# Patient Record
Sex: MALE | Race: WHITE | NOT HISPANIC OR LATINO | Employment: FULL TIME | URBAN - METROPOLITAN AREA
[De-identification: names, ages, dates, MRNs, and addresses within clinical notes are randomized per-mention and may not be internally consistent; named-entity substitution may affect disease eponyms.]

---

## 2017-11-30 ENCOUNTER — OFFICE VISIT (OUTPATIENT)
Dept: FAMILY MEDICINE | Facility: CLINIC | Age: 57
End: 2017-11-30
Payer: COMMERCIAL

## 2017-11-30 VITALS
DIASTOLIC BLOOD PRESSURE: 62 MMHG | HEART RATE: 72 BPM | BODY MASS INDEX: 26.98 KG/M2 | SYSTOLIC BLOOD PRESSURE: 200 MMHG | WEIGHT: 199.19 LBS | HEIGHT: 72 IN | TEMPERATURE: 99 F

## 2017-11-30 DIAGNOSIS — E66.3 OVERWEIGHT (BMI 25.0-29.9): ICD-10-CM

## 2017-11-30 DIAGNOSIS — Z72.0 NICOTINE ABUSE: ICD-10-CM

## 2017-11-30 DIAGNOSIS — F15.10 CAFFEINE ABUSE: ICD-10-CM

## 2017-11-30 DIAGNOSIS — J32.9 SINUSITIS, UNSPECIFIED CHRONICITY, UNSPECIFIED LOCATION: ICD-10-CM

## 2017-11-30 DIAGNOSIS — I16.0 HYPERTENSIVE URGENCY: Primary | ICD-10-CM

## 2017-11-30 DIAGNOSIS — Z12.5 PROSTATE CANCER SCREENING: ICD-10-CM

## 2017-11-30 DIAGNOSIS — Z00.00 HEALTHCARE MAINTENANCE: ICD-10-CM

## 2017-11-30 PROCEDURE — 99999 PR PBB SHADOW E&M-NEW PATIENT-LVL III: CPT | Mod: PBBFAC,,, | Performed by: INTERNAL MEDICINE

## 2017-11-30 PROCEDURE — 93005 ELECTROCARDIOGRAM TRACING: CPT | Mod: S$GLB,,, | Performed by: INTERNAL MEDICINE

## 2017-11-30 PROCEDURE — 93010 ELECTROCARDIOGRAM REPORT: CPT | Mod: S$GLB,,, | Performed by: INTERNAL MEDICINE

## 2017-11-30 PROCEDURE — 99204 OFFICE O/P NEW MOD 45 MIN: CPT | Mod: S$GLB,,, | Performed by: INTERNAL MEDICINE

## 2017-11-30 RX ORDER — METOPROLOL SUCCINATE 25 MG/1
TABLET, EXTENDED RELEASE ORAL
Qty: 30 TABLET | Refills: 2 | Status: SHIPPED | OUTPATIENT
Start: 2017-11-30 | End: 2018-02-06

## 2017-11-30 RX ORDER — AZITHROMYCIN 250 MG/1
1 TABLET, FILM COATED ORAL DAILY
Refills: 0 | COMMUNITY
Start: 2017-11-29 | End: 2017-12-04

## 2017-11-30 RX ORDER — BENZONATATE 200 MG/1
1 CAPSULE ORAL DAILY
Refills: 0 | COMMUNITY
Start: 2017-11-29 | End: 2018-02-06

## 2017-11-30 RX ORDER — CLONIDINE HYDROCHLORIDE 0.1 MG/1
0.1 TABLET ORAL
Status: COMPLETED | OUTPATIENT
Start: 2017-11-30 | End: 2017-11-30

## 2017-11-30 RX ADMIN — CLONIDINE HYDROCHLORIDE 0.1 MG: 0.1 TABLET ORAL at 03:11

## 2017-11-30 NOTE — PATIENT INSTRUCTIONS
Begin metoprolol 25 mg first dose today; then w every morning; monitor BP at home closely; to ER tonight if  or higher or DBP>95. To see me in 2 weeks w labs prior. Call if needed.

## 2017-11-30 NOTE — PROGRESS NOTES
Subjective:       Patient ID: Marshall Victor is a 57 y.o. male.    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 Has been going to [facility] urgent care for any issues, last 1 yr ago and not elevated; periodically checks his BP at home ; wife w hx of pre-eclampsia. At home last night 145/72; caffeine good bitt of note. No chest pain or SOB; no HA, or vision change. Trying to quit cigarettes but more stress today; smoked more; gave large presentation as well which was stressful. On nicorette losenger prn as well. PSA 2-3 yrs ago was nl; declines TC; not done yet. Will do FOBT though. No hx of stool for OCB. At 440 pm, /84 after clonidine 0.1 mg given at 335 pm. No limb numbness or weakness, or vision change. Prefers to not go to ER if possible. Feel can be managed at home w med and close monitoring.  Patient to begin metoprolol succinate 25 mg by mouth this afternoon/tonight and every morning thereafter and maintain a less than 2 g sodium diet.  Close blood pressure monitoring at home and keeping a log was highly advised. EKG to be obtained. To ER if SBP greater or equal to 190 or DBP>95.  Plan of care discussed w pt at length. Labs ordered.    Review of Systems   Constitutional: Negative for appetite change and unexpected weight change.   HENT: Positive for congestion, postnasal drip, rhinorrhea and sinus pressure.         Denies seasonal allergies, or perennial allergies; on Z-pack 5 day for sinusitis from recent urgent care visit.   Eyes: Negative for discharge and itching.   Respiratory: Negative for cough, chest tightness, shortness of breath and wheezing.    Cardiovascular: Negative for chest pain, palpitations and leg swelling.   Gastrointestinal: Negative for abdominal pain, blood in stool, constipation, diarrhea, nausea and vomiting.   Endocrine: Negative for  polydipsia, polyphagia and polyuria.   Genitourinary: Negative for dysuria and hematuria.   Musculoskeletal: Negative for arthralgias and myalgias.   Skin: Negative for rash.   Allergic/Immunologic: Negative for environmental allergies and food allergies.   Neurological: Negative for dizziness, tremors, seizures, speech difficulty, light-headedness, numbness and headaches.   Hematological: Negative for adenopathy. Does not bruise/bleed easily.   Psychiatric/Behavioral:        Denies anxiety or depression.       Objective:      Vitals:    11/30/17 1504   BP: (!) 200/62   Pulse: 72   Temp: 98.6 °F (37 °C)   TempSrc: Oral   Weight: 90.4 kg (199 lb 3 oz)   Height: 6' (1.829 m)     Body mass index is 27.01 kg/m².    Physical Exam   Constitutional: He is oriented to person, place, and time. He appears well-developed and well-nourished.   HENT:   Head: Normocephalic and atraumatic.   Eyes: EOM are normal.   Neck: Normal range of motion. Neck supple. No thyromegaly present.   No carotid bruits heard.   Cardiovascular: Normal rate, regular rhythm and normal heart sounds.  Exam reveals no gallop.    No murmur heard.  Pulmonary/Chest: Effort normal and breath sounds normal. No respiratory distress. He has no wheezes. He has no rales.   Abdominal: Soft. Bowel sounds are normal. He exhibits no distension. There is no tenderness. There is no rebound and no guarding.   Musculoskeletal: Normal range of motion. He exhibits no edema.   Lymphadenopathy:     He has no cervical adenopathy.   Neurological: He is alert and oriented to person, place, and time.   Moves all 4 extremities fine. CN's II-XII grossly intact; smell not checked; MS 5/5; DTR's 2+ UE's and LE's.   Skin: No rash noted.   Psychiatric: He has a normal mood and affect. His behavior is normal. Thought content normal.   Vitals reviewed.      Assessment:       1. Hypertensive urgency    2. Overweight (BMI 25.0-29.9)    3. Nicotine abuse    4. Caffeine abuse    5.  Sinusitis, unspecified chronicity, unspecified location    6. Prostate cancer screening    7. Healthcare maintenance        Plan:       Hypertensive urgency. Maintain < 2 Gm Na a day diet, and monitor BP at home; keep a log. Stress reduction; limit caffeine more.     Labs ordered-     cloNIDine tablet 0.1 mg; Take 1 tablet (0.1 mg total) by mouth one time.         Metoprolol succinate 25 mg po today, then 25 mg po every am.    Overweight (BMI 25.0-29.9). Caloric restriction w regular exercise and weight reduction.    Nicotine abuse. Knows he needs to quit; aware of risks involved. 1-800-QUIT-NOW; for counseling and support.  Continue nicotine losengers on a when necessary basis    Caffeine abuse; needs to reduce caffeine intake; consider 1/2 cut caffeine.    Sinusitis, unspecified chronicity, unspecified location; finish Z-pack; tody is day #2; no decongestants; has benzonate for cough.     Healthcare maintenance; PSA, FOBT ordered; TC declined.    Addendum: EKG w no evidence of ischemic changes.

## 2017-12-05 ENCOUNTER — LAB VISIT (OUTPATIENT)
Dept: LAB | Facility: HOSPITAL | Age: 57
End: 2017-12-05
Attending: INTERNAL MEDICINE
Payer: COMMERCIAL

## 2017-12-05 DIAGNOSIS — Z12.5 PROSTATE CANCER SCREENING: ICD-10-CM

## 2017-12-05 DIAGNOSIS — E66.3 OVERWEIGHT (BMI 25.0-29.9): ICD-10-CM

## 2017-12-05 DIAGNOSIS — Z00.00 HEALTHCARE MAINTENANCE: ICD-10-CM

## 2017-12-05 DIAGNOSIS — I16.0 HYPERTENSIVE URGENCY: ICD-10-CM

## 2017-12-05 LAB
ALBUMIN SERPL BCP-MCNC: 3.8 G/DL
ALP SERPL-CCNC: 70 U/L
ALT SERPL W/O P-5'-P-CCNC: 29 U/L
ANION GAP SERPL CALC-SCNC: 11 MMOL/L
AST SERPL-CCNC: 24 U/L
BASOPHILS # BLD AUTO: 0.08 K/UL
BASOPHILS NFR BLD: 0.7 %
BILIRUB SERPL-MCNC: 0.4 MG/DL
BUN SERPL-MCNC: 14 MG/DL
CALCIUM SERPL-MCNC: 9.5 MG/DL
CHLORIDE SERPL-SCNC: 92 MMOL/L
CHOLEST SERPL-MCNC: 180 MG/DL
CHOLEST/HDLC SERPL: 2.9 {RATIO}
CO2 SERPL-SCNC: 23 MMOL/L
CREAT SERPL-MCNC: 1.1 MG/DL
DIFFERENTIAL METHOD: ABNORMAL
EOSINOPHIL # BLD AUTO: 0.1 K/UL
EOSINOPHIL NFR BLD: 0.8 %
ERYTHROCYTE [DISTWIDTH] IN BLOOD BY AUTOMATED COUNT: 11.9 %
EST. GFR  (AFRICAN AMERICAN): >60 ML/MIN/1.73 M^2
EST. GFR  (NON AFRICAN AMERICAN): >60 ML/MIN/1.73 M^2
GLUCOSE SERPL-MCNC: 106 MG/DL
HCT VFR BLD AUTO: 42.9 %
HDLC SERPL-MCNC: 62 MG/DL
HDLC SERPL: 34.4 %
HGB BLD-MCNC: 15.1 G/DL
IMM GRANULOCYTES # BLD AUTO: 0.08 K/UL
IMM GRANULOCYTES NFR BLD AUTO: 0.7 %
LDLC SERPL CALC-MCNC: 93.8 MG/DL
LYMPHOCYTES # BLD AUTO: 2.6 K/UL
LYMPHOCYTES NFR BLD: 23.6 %
MCH RBC QN AUTO: 30.6 PG
MCHC RBC AUTO-ENTMCNC: 35.2 G/DL
MCV RBC AUTO: 87 FL
MONOCYTES # BLD AUTO: 1.4 K/UL
MONOCYTES NFR BLD: 12.8 %
NEUTROPHILS # BLD AUTO: 6.7 K/UL
NEUTROPHILS NFR BLD: 61.4 %
NONHDLC SERPL-MCNC: 118 MG/DL
NRBC BLD-RTO: 0 /100 WBC
PLATELET # BLD AUTO: 383 K/UL
PMV BLD AUTO: 9 FL
POTASSIUM SERPL-SCNC: 4.4 MMOL/L
PROT SERPL-MCNC: 7.4 G/DL
RBC # BLD AUTO: 4.93 M/UL
SODIUM SERPL-SCNC: 126 MMOL/L
T4 FREE SERPL-MCNC: 1.27 NG/DL
TRIGL SERPL-MCNC: 121 MG/DL
TSH SERPL DL<=0.005 MIU/L-ACNC: 1.07 UIU/ML
WBC # BLD AUTO: 10.91 K/UL

## 2017-12-05 PROCEDURE — 80053 COMPREHEN METABOLIC PANEL: CPT

## 2017-12-05 PROCEDURE — 84439 ASSAY OF FREE THYROXINE: CPT

## 2017-12-05 PROCEDURE — 84153 ASSAY OF PSA TOTAL: CPT

## 2017-12-05 PROCEDURE — 36415 COLL VENOUS BLD VENIPUNCTURE: CPT | Mod: PN

## 2017-12-05 PROCEDURE — 80061 LIPID PANEL: CPT

## 2017-12-05 PROCEDURE — 86803 HEPATITIS C AB TEST: CPT

## 2017-12-05 PROCEDURE — 85025 COMPLETE CBC W/AUTO DIFF WBC: CPT

## 2017-12-05 PROCEDURE — 84443 ASSAY THYROID STIM HORMONE: CPT

## 2017-12-06 ENCOUNTER — TELEPHONE (OUTPATIENT)
Dept: FAMILY MEDICINE | Facility: CLINIC | Age: 57
End: 2017-12-06

## 2017-12-06 DIAGNOSIS — E87.1 HYPONATREMIA: Primary | ICD-10-CM

## 2017-12-06 LAB
COMPLEXED PSA SERPL-MCNC: 1.1 NG/ML
HCV AB SERPL QL IA: NEGATIVE

## 2017-12-06 NOTE — TELEPHONE ENCOUNTER
These notify patient that we received his labs his sodium was low at 126.  His reflects an increase of free body water.  Asked him if he's been drinking a lot of water all day long and if so to stop and maintain less than 800 to thousand cc of water per day; other words don't make water his main fluid intake; he needs to repeat her blood chemistry now to verify results.  An order has been placed at [facility]; he does not need to be fasting for this.  Please schedule follow-up appointment to see me in the next few days to go over results

## 2017-12-07 ENCOUNTER — LAB VISIT (OUTPATIENT)
Dept: LAB | Facility: HOSPITAL | Age: 57
End: 2017-12-07
Attending: INTERNAL MEDICINE
Payer: COMMERCIAL

## 2017-12-07 DIAGNOSIS — E87.1 HYPONATREMIA: ICD-10-CM

## 2017-12-07 LAB
ANION GAP SERPL CALC-SCNC: 8 MMOL/L
BUN SERPL-MCNC: 13 MG/DL
CALCIUM SERPL-MCNC: 9.6 MG/DL
CHLORIDE SERPL-SCNC: 96 MMOL/L
CO2 SERPL-SCNC: 25 MMOL/L
CREAT SERPL-MCNC: 1 MG/DL
EST. GFR  (AFRICAN AMERICAN): >60 ML/MIN/1.73 M^2
EST. GFR  (NON AFRICAN AMERICAN): >60 ML/MIN/1.73 M^2
GLUCOSE SERPL-MCNC: 85 MG/DL
POTASSIUM SERPL-SCNC: 4.6 MMOL/L
SODIUM SERPL-SCNC: 129 MMOL/L

## 2017-12-07 PROCEDURE — 80048 BASIC METABOLIC PNL TOTAL CA: CPT

## 2017-12-07 PROCEDURE — 36415 COLL VENOUS BLD VENIPUNCTURE: CPT | Mod: PN

## 2017-12-08 ENCOUNTER — OFFICE VISIT (OUTPATIENT)
Dept: FAMILY MEDICINE | Facility: CLINIC | Age: 57
End: 2017-12-08
Payer: COMMERCIAL

## 2017-12-08 ENCOUNTER — LAB VISIT (OUTPATIENT)
Dept: LAB | Facility: HOSPITAL | Age: 57
End: 2017-12-08
Attending: INTERNAL MEDICINE
Payer: COMMERCIAL

## 2017-12-08 VITALS
TEMPERATURE: 98 F | DIASTOLIC BLOOD PRESSURE: 80 MMHG | SYSTOLIC BLOOD PRESSURE: 132 MMHG | HEART RATE: 72 BPM | BODY MASS INDEX: 26.2 KG/M2 | WEIGHT: 193.44 LBS | HEIGHT: 72 IN

## 2017-12-08 DIAGNOSIS — T50.905A MEDICATION SIDE EFFECT, INITIAL ENCOUNTER: ICD-10-CM

## 2017-12-08 DIAGNOSIS — E87.1 HYPONATREMIA: ICD-10-CM

## 2017-12-08 DIAGNOSIS — E66.3 OVERWEIGHT (BMI 25.0-29.9): ICD-10-CM

## 2017-12-08 DIAGNOSIS — Z23 NEED FOR DIPHTHERIA-TETANUS-PERTUSSIS (TDAP) VACCINE: ICD-10-CM

## 2017-12-08 DIAGNOSIS — I16.0 HYPERTENSIVE URGENCY: ICD-10-CM

## 2017-12-08 DIAGNOSIS — Z78.9 ALCOHOL USE: ICD-10-CM

## 2017-12-08 DIAGNOSIS — I10 UNCONTROLLED HYPERTENSION: Primary | ICD-10-CM

## 2017-12-08 LAB
BILIRUB UR QL STRIP: NEGATIVE
CLARITY UR REFRACT.AUTO: CLEAR
COLOR UR AUTO: YELLOW
GLUCOSE UR QL STRIP: NEGATIVE
HGB UR QL STRIP: ABNORMAL
KETONES UR QL STRIP: NEGATIVE
LEUKOCYTE ESTERASE UR QL STRIP: NEGATIVE
MICROSCOPIC COMMENT: NORMAL
NITRITE UR QL STRIP: NEGATIVE
PH UR STRIP: 6 [PH] (ref 5–8)
PROT UR QL STRIP: NEGATIVE
RBC #/AREA URNS AUTO: 4 /HPF (ref 0–4)
SP GR UR STRIP: 1.01 (ref 1–1.03)
URN SPEC COLLECT METH UR: ABNORMAL
UROBILINOGEN UR STRIP-ACNC: NEGATIVE EU/DL
WBC #/AREA URNS AUTO: 0 /HPF (ref 0–5)

## 2017-12-08 PROCEDURE — 99999 PR PBB SHADOW E&M-EST. PATIENT-LVL III: CPT | Mod: PBBFAC,,, | Performed by: INTERNAL MEDICINE

## 2017-12-08 PROCEDURE — 90686 IIV4 VACC NO PRSV 0.5 ML IM: CPT | Mod: S$GLB,,, | Performed by: INTERNAL MEDICINE

## 2017-12-08 PROCEDURE — 90715 TDAP VACCINE 7 YRS/> IM: CPT | Mod: S$GLB,,, | Performed by: INTERNAL MEDICINE

## 2017-12-08 PROCEDURE — 81001 URINALYSIS AUTO W/SCOPE: CPT

## 2017-12-08 PROCEDURE — 90471 IMMUNIZATION ADMIN: CPT | Mod: S$GLB,,, | Performed by: INTERNAL MEDICINE

## 2017-12-08 PROCEDURE — 99214 OFFICE O/P EST MOD 30 MIN: CPT | Mod: 25,S$GLB,, | Performed by: INTERNAL MEDICINE

## 2017-12-08 PROCEDURE — 90472 IMMUNIZATION ADMIN EACH ADD: CPT | Mod: S$GLB,,, | Performed by: INTERNAL MEDICINE

## 2017-12-08 RX ORDER — AMLODIPINE BESYLATE 2.5 MG/1
2.5 TABLET ORAL DAILY
Qty: 30 TABLET | Refills: 2 | Status: SHIPPED | OUTPATIENT
Start: 2017-12-08 | End: 2018-03-05 | Stop reason: SDUPTHER

## 2017-12-08 NOTE — PROGRESS NOTES
Subjective:       Patient ID: Marshall Victor is a 57 y.o. male.    Chief Complaint: Follow-up    HPI   Pt started on metoprolol succinate 25 mg daily for uncontrolled HTN last visit; doing better BP running at home 150-170/60-80 w watching his salt intake; has cut back on his caffeine; recommended 1/2 cut for his coffee. No chest pain, SOB, or palp noted. URI cleared but some diarrhea w Z-pack; also now clearing. Diarrhea w zithromax use may be contributing to his hyponatremia along w his free water consumption which is a fair amount along w coffee intake; of which he has decreased. Also needs to reduce his beer consumption. Labs discussed w pt at length. Appetite returning. Labs reviewed w pt at length; questions answered.    Review of Systems   Constitutional: Negative for appetite change and unexpected weight change.   HENT: Negative for congestion, postnasal drip, rhinorrhea and sinus pressure.         Denies seasonal allergies, or perennial allergies   Eyes: Negative for discharge and itching.   Respiratory: Negative for cough, chest tightness, shortness of breath and wheezing.    Cardiovascular: Negative for chest pain, palpitations and leg swelling.   Gastrointestinal: Positive for diarrhea. Negative for abdominal pain, blood in stool, constipation, nausea and vomiting.        Clearing from Z-pack.   Endocrine: Negative for polydipsia, polyphagia and polyuria.   Genitourinary: Negative for dysuria and hematuria.   Musculoskeletal: Negative for arthralgias, myalgias and neck pain.   Skin: Negative for rash.   Allergic/Immunologic: Negative for environmental allergies and food allergies.   Neurological: Negative for tremors, seizures and headaches.   Hematological: Negative for adenopathy. Does not bruise/bleed easily.   Psychiatric/Behavioral:        Denies anxiety or depression.       Objective:      Vitals:    12/08/17 1304 12/08/17 1315   BP: (!) 164/80 132/80   Pulse: 72    Temp: 98.1 °F (36.7 °C)     Weight: 87.8 kg (193 lb 7.3 oz)    Height: 6' (1.829 m)      Body mass index is 26.24 kg/m².    Physical Exam    Assessment:       1. Uncontrolled hypertension    2. Overweight (BMI 25.0-29.9)    3. Hyponatremia    4. Medication side effect, initial encounter    5. Need for diphtheria-tetanus-pertussis (Tdap) vaccine        Plan:       Uncontrolled hypertension. Maintain < 2 Gm Na a day diet, and monitor BP at home; keep a log. Cont metoprolol succinate 25 mg a day;     Add amlodipine 2.5 mg a day. Exercise w weight reduction. To limitjhis caffeine intake as well.    Overweight (BMI 25.0-29.9). Caloric restriction w regular exercise and weight reduction.    Hyponatremia; limit free water during the day to 800-1000 cc; limit beer and alcohol. Limit caffeine intake as well;     Check ser osm, and urine osm w spot urine sodium.    Medication side effect, initial encounter; Zithromax led to diarrhea; resolving.    Need for diphtheria-tetanus-pertussis (Tdap) vaccine; influenza as well.

## 2017-12-08 NOTE — PATIENT INSTRUCTIONS
Uncontrolled hypertension. Maintain < 2 Gm Na a day diet, and monitor BP at home; keep a log. Cont metoprolol succinate 25 mg a day;     Add amlodipine 2.5 mg a day. Exercise w weight reduction. To limitjhis caffeine intake as well.    Overweight (BMI 25.0-29.9). Caloric restriction w regular exercise and weight reduction.    Hyponatremia; limit free water during the day to 800-1000 cc; limit beer and alcohol. Limit caffeine intake as well;     Check ser osm, and urine osm w spot urine sodium.    Medication side effect, initial encounter; Zithromax led to diarrhea; resolving.    Need for diphtheria-tetanus-pertussis (Tdap) vaccine; influenza as well.

## 2018-01-31 ENCOUNTER — LAB VISIT (OUTPATIENT)
Dept: LAB | Facility: HOSPITAL | Age: 58
End: 2018-01-31
Attending: INTERNAL MEDICINE
Payer: COMMERCIAL

## 2018-01-31 ENCOUNTER — TELEPHONE (OUTPATIENT)
Dept: FAMILY MEDICINE | Facility: CLINIC | Age: 58
End: 2018-01-31

## 2018-01-31 DIAGNOSIS — I10 UNCONTROLLED HYPERTENSION: ICD-10-CM

## 2018-01-31 DIAGNOSIS — E87.1 HYPONATREMIA: ICD-10-CM

## 2018-01-31 LAB
ANION GAP SERPL CALC-SCNC: 9 MMOL/L
BUN SERPL-MCNC: 14 MG/DL
CALCIUM SERPL-MCNC: 9.1 MG/DL
CHLORIDE SERPL-SCNC: 97 MMOL/L
CO2 SERPL-SCNC: 24 MMOL/L
CREAT SERPL-MCNC: 1.2 MG/DL
EST. GFR  (AFRICAN AMERICAN): >60 ML/MIN/1.73 M^2
EST. GFR  (NON AFRICAN AMERICAN): >60 ML/MIN/1.73 M^2
GLUCOSE SERPL-MCNC: 98 MG/DL
MAGNESIUM SERPL-MCNC: 2 MG/DL
POTASSIUM SERPL-SCNC: 4 MMOL/L
SODIUM SERPL-SCNC: 130 MMOL/L

## 2018-01-31 PROCEDURE — 83735 ASSAY OF MAGNESIUM: CPT

## 2018-01-31 PROCEDURE — 36415 COLL VENOUS BLD VENIPUNCTURE: CPT | Mod: PN

## 2018-01-31 PROCEDURE — 80048 BASIC METABOLIC PNL TOTAL CA: CPT

## 2018-01-31 PROCEDURE — 83930 ASSAY OF BLOOD OSMOLALITY: CPT

## 2018-01-31 NOTE — TELEPHONE ENCOUNTER
Spoke with pt and he wanted to schedule his labs for today; pt was scheduled for today 01/31/2018.

## 2018-01-31 NOTE — TELEPHONE ENCOUNTER
----- Message from Lalita Abel sent at 1/31/2018  1:44 PM CST -----  Orders for blood work to be done today.  Please call patient at [phone]

## 2018-02-01 LAB — OSMOLALITY SERPL: 279 MOSM/KG

## 2018-02-06 ENCOUNTER — OFFICE VISIT (OUTPATIENT)
Dept: FAMILY MEDICINE | Facility: CLINIC | Age: 58
End: 2018-02-06
Payer: COMMERCIAL

## 2018-02-06 VITALS
SYSTOLIC BLOOD PRESSURE: 160 MMHG | OXYGEN SATURATION: 98 % | WEIGHT: 198.44 LBS | DIASTOLIC BLOOD PRESSURE: 90 MMHG | HEART RATE: 94 BPM | TEMPERATURE: 98 F | HEIGHT: 72 IN | BODY MASS INDEX: 26.88 KG/M2

## 2018-02-06 DIAGNOSIS — E87.1 HYPONATREMIA: ICD-10-CM

## 2018-02-06 DIAGNOSIS — I10 UNCONTROLLED HYPERTENSION: Primary | ICD-10-CM

## 2018-02-06 DIAGNOSIS — E66.3 OVERWEIGHT (BMI 25.0-29.9): ICD-10-CM

## 2018-02-06 DIAGNOSIS — I10 WHITE COAT SYNDROME WITH DIAGNOSIS OF HYPERTENSION: ICD-10-CM

## 2018-02-06 PROCEDURE — 99213 OFFICE O/P EST LOW 20 MIN: CPT | Mod: S$GLB,,, | Performed by: INTERNAL MEDICINE

## 2018-02-06 PROCEDURE — 99999 PR PBB SHADOW E&M-EST. PATIENT-LVL III: CPT | Mod: PBBFAC,,, | Performed by: INTERNAL MEDICINE

## 2018-02-06 PROCEDURE — 3008F BODY MASS INDEX DOCD: CPT | Mod: S$GLB,,, | Performed by: INTERNAL MEDICINE

## 2018-02-06 RX ORDER — METOPROLOL SUCCINATE 50 MG/1
50 TABLET, EXTENDED RELEASE ORAL DAILY
Qty: 30 TABLET | Refills: 2 | Status: SHIPPED | OUTPATIENT
Start: 2018-02-06 | End: 2018-05-14 | Stop reason: SDUPTHER

## 2018-02-06 NOTE — PROGRESS NOTES
Subjective:       Patient ID: Marshall Victor is a 57 y.o. male.    Chief Complaint: Follow-up (labs/BP)    HPI  BP avr at home 150/76; gets nervous in office also. Taking metoprolol w amlodipine; watching salt intake. Overweight: exercises 2x a week; knows he needs to increase; walks a lot w day; hits goal of 10,000 steps. Hyponatremia being w/u; needs to decrease beer/alcohol intake. Needs to submit urine for osm, and spot Na yet. Labs reviewed w pt, new and old.    Review of Systems   Constitutional: Negative for chills, fever and unexpected weight change.   HENT: Negative for congestion, postnasal drip and rhinorrhea.    Respiratory: Negative for cough, chest tightness, shortness of breath and wheezing.    Cardiovascular: Negative for chest pain, palpitations and leg swelling.   Gastrointestinal: Negative for abdominal pain, constipation, diarrhea, nausea and vomiting.   Genitourinary: Negative for dysuria, hematuria and urgency.   Musculoskeletal: Negative for arthralgias, myalgias and neck pain.   Skin: Negative for rash.   Neurological: Negative for dizziness, tremors and headaches.       Objective:      Vitals:    02/06/18 0805   BP: (!) 160/90   BP Location: Left arm   Patient Position: Sitting   BP Method: Medium (Manual)   Pulse: 94   Temp: 97.6 °F (36.4 °C)   TempSrc: Oral   SpO2: 98%   Weight: 90 kg (198 lb 6.6 oz)   Height: 6' (1.829 m)     Body mass index is 26.91 kg/m².    Physical Exam    Assessment:       1. Uncontrolled hypertension    2. White coat syndrome with diagnosis of hypertension    3. Hyponatremia    4. Overweight (BMI 25.0-29.9)        Plan:       Uncontrolled hypertension. Maintain < 2 Gm Na a day diet, and monitor BP at home; keep a log. Increase metoprolol succinate to 50 mg each morning.        Cont amlodipine.    White coat syndrome with diagnosis of hypertension; bringing in home log will help.    Hyponatremia; less alcohol and free water during the day. Needs to give urine for  spot Na and urine osm.    Overweight (BMI 25.0-29.9). Caloric restriction w regular exercise and weight reduction.    Other orders  -     metoprolol succinate (TOPROL-XL) 50 MG 24 hr tablet; Take 1 tablet (50 mg total) by mouth once daily. In morning.  Dispense: 30 tablet; Refill: 2

## 2018-02-06 NOTE — PATIENT INSTRUCTIONS
Uncontrolled hypertension. Maintain < 2 Gm Na a day diet, and monitor BP at home; keep a log. Increase metoprolol succinate to 50 mg each morning.        Cont amlodipine.    White coat syndrome with diagnosis of hypertension; bringing in home log will help.    Hyponatremia; less alcohol and free water during the day. Needs to give urine for spot Na and urine osm.    Overweight (BMI 25.0-29.9). Caloric restriction w regular exercise and weight reduction.    Other orders  -     metoprolol succinate (TOPROL-XL) 50 MG 24 hr tablet; Take 1 tablet (50 mg total) by mouth once daily. In morning.  Dispense: 30 tablet; Refill: 2

## 2018-02-08 ENCOUNTER — TELEPHONE (OUTPATIENT)
Dept: FAMILY MEDICINE | Facility: CLINIC | Age: 58
End: 2018-02-08

## 2018-02-08 DIAGNOSIS — Z12.11 COLON CANCER SCREENING: Primary | ICD-10-CM

## 2018-03-05 DIAGNOSIS — I10 UNCONTROLLED HYPERTENSION: ICD-10-CM

## 2018-03-07 RX ORDER — AMLODIPINE BESYLATE 2.5 MG/1
TABLET ORAL
Qty: 30 TABLET | Refills: 2 | Status: SHIPPED | OUTPATIENT
Start: 2018-03-07 | End: 2018-06-04 | Stop reason: SDUPTHER

## 2018-04-03 ENCOUNTER — TELEPHONE (OUTPATIENT)
Dept: FAMILY MEDICINE | Facility: CLINIC | Age: 58
End: 2018-04-03

## 2018-04-04 ENCOUNTER — LAB VISIT (OUTPATIENT)
Dept: LAB | Facility: HOSPITAL | Age: 58
End: 2018-04-04
Attending: INTERNAL MEDICINE
Payer: COMMERCIAL

## 2018-04-04 DIAGNOSIS — E87.1 HYPONATREMIA: ICD-10-CM

## 2018-04-04 DIAGNOSIS — I10 UNCONTROLLED HYPERTENSION: ICD-10-CM

## 2018-04-04 LAB
ANION GAP SERPL CALC-SCNC: 9 MMOL/L
BUN SERPL-MCNC: 12 MG/DL
CALCIUM SERPL-MCNC: 9.3 MG/DL
CHLORIDE SERPL-SCNC: 100 MMOL/L
CO2 SERPL-SCNC: 24 MMOL/L
CREAT SERPL-MCNC: 1 MG/DL
EST. GFR  (AFRICAN AMERICAN): >60 ML/MIN/1.73 M^2
EST. GFR  (NON AFRICAN AMERICAN): >60 ML/MIN/1.73 M^2
GLUCOSE SERPL-MCNC: 126 MG/DL
POTASSIUM SERPL-SCNC: 4.5 MMOL/L
SODIUM SERPL-SCNC: 133 MMOL/L

## 2018-04-04 PROCEDURE — 80048 BASIC METABOLIC PNL TOTAL CA: CPT

## 2018-04-04 PROCEDURE — 36415 COLL VENOUS BLD VENIPUNCTURE: CPT | Mod: PN

## 2018-04-27 ENCOUNTER — OFFICE VISIT (OUTPATIENT)
Dept: FAMILY MEDICINE | Facility: CLINIC | Age: 58
End: 2018-04-27
Payer: COMMERCIAL

## 2018-04-27 VITALS
OXYGEN SATURATION: 97 % | WEIGHT: 195.31 LBS | TEMPERATURE: 98 F | BODY MASS INDEX: 26.45 KG/M2 | DIASTOLIC BLOOD PRESSURE: 80 MMHG | SYSTOLIC BLOOD PRESSURE: 136 MMHG | HEART RATE: 79 BPM | HEIGHT: 72 IN

## 2018-04-27 DIAGNOSIS — I10 UNCONTROLLED HYPERTENSION: Primary | ICD-10-CM

## 2018-04-27 DIAGNOSIS — F41.1 GAD (GENERALIZED ANXIETY DISORDER): ICD-10-CM

## 2018-04-27 DIAGNOSIS — E66.3 OVERWEIGHT (BMI 25.0-29.9): ICD-10-CM

## 2018-04-27 DIAGNOSIS — E87.1 HYPONATREMIA: ICD-10-CM

## 2018-04-27 PROCEDURE — 99999 PR PBB SHADOW E&M-EST. PATIENT-LVL III: CPT | Mod: PBBFAC,,, | Performed by: INTERNAL MEDICINE

## 2018-04-27 PROCEDURE — 3075F SYST BP GE 130 - 139MM HG: CPT | Mod: CPTII,S$GLB,, | Performed by: INTERNAL MEDICINE

## 2018-04-27 PROCEDURE — 99213 OFFICE O/P EST LOW 20 MIN: CPT | Mod: S$GLB,,, | Performed by: INTERNAL MEDICINE

## 2018-04-27 PROCEDURE — 3079F DIAST BP 80-89 MM HG: CPT | Mod: CPTII,S$GLB,, | Performed by: INTERNAL MEDICINE

## 2018-04-27 RX ORDER — ESCITALOPRAM OXALATE 5 MG/1
TABLET ORAL
Qty: 30 TABLET | Refills: 2 | Status: SHIPPED | OUTPATIENT
Start: 2018-04-27 | End: 2019-01-02

## 2018-04-27 NOTE — PATIENT INSTRUCTIONS
Uncontrolled hypertension; cont metoprolol and amlodipine; watch his salt content. Add lexapro 5 mg a day;     ALYSON: add lexapro 5 mg a day; after a couple of weeks can increase to 10 mg a day if needed.    Hyponatremia; less free water during the day but not fluids; limit alcohol intake; BMP before f/u.    Overweight (BMI 25.0-29.9). Caloric restriction w regular exercise and weight reduction.

## 2018-04-27 NOTE — PROGRESS NOTES
Subjective:       Patient ID: Marshall Victor is a 57 y.o. male.    Chief Complaint: Follow-up and to go over his labs.     HPI  Overall doing great.  HTN: BP avr at home 150/77 but has been trending down; was 180's-190's SBP/upper 80's. Today manually 136/80. Watches his salt intake. On amlodipine and metoprolol.  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 Sleeps well; unable to leave his job at work; denies depression. Anxiety an issue.  Hyponatremia; improved; has decreased beer; but needs to decrease ree water intake.    Review of Systems   Constitutional: Negative for appetite change and unexpected weight change.   HENT: Negative for congestion, postnasal drip and sinus pressure.    Respiratory: Negative for cough, chest tightness, shortness of breath and wheezing.    Cardiovascular: Negative for chest pain, palpitations and leg swelling.   Gastrointestinal: Negative for abdominal pain, blood in stool, constipation, diarrhea, nausea and vomiting.   Genitourinary: Negative for dysuria and hematuria.   Musculoskeletal: Negative for arthralgias, myalgias and neck pain.   Skin: Negative for rash.   Neurological: Negative for headaches.       Objective:      Vitals:    04/27/18 1122   BP: 136/80   BP Location: Left arm   Patient Position: Sitting   BP Method: Medium (Manual)   Pulse: 79   Temp: 98.1 °F (36.7 °C)   TempSrc: Oral   SpO2: 97%   Weight: 88.6 kg (195 lb 5.2 oz)   Height: 6' (1.829 m)     Body mass index is 26.49 kg/m².    Physical Exam   Constitutional: He is oriented to person, place, and time. He appears well-developed and well-nourished.   HENT:   Head: Normocephalic and atraumatic.   Eyes: EOM are normal.   Neck: Normal range of motion. Neck supple. No thyromegaly present.   Cardiovascular: Normal rate, regular rhythm and normal heart sounds.  Exam reveals no gallop.    No murmur heard.  Pulmonary/Chest: Effort normal and breath sounds normal. No respiratory distress. He has no  wheezes. He has no rales.   Abdominal: Soft. Bowel sounds are normal. He exhibits no distension. There is no tenderness. There is no rebound and no guarding.   Musculoskeletal: Normal range of motion. He exhibits no edema.   Lymphadenopathy:     He has no cervical adenopathy.   Neurological: He is alert and oriented to person, place, and time.   Moves all 4 extremities fine.   Skin: No rash noted.   Psychiatric: He has a normal mood and affect. His behavior is normal. Thought content normal.   Vitals reviewed.      Assessment:       1. Uncontrolled hypertension    2. ALYSON (generalized anxiety disorder)    3. Hyponatremia    4. Overweight (BMI 25.0-29.9)        Plan:       Uncontrolled hypertension; cont metoprolol and amlodipine; watch his salt content. Add lexapro 5 mg a day;     ALYSON: add lexapro 5 mg a day; after a couple of weeks can increase to 10 mg a day if needed.    Hyponatremia; less free water during the day but not fluids; limit alcohol intake; BMP before f/u.    Overweight (BMI 25.0-29.9). Caloric restriction w regular exercise and weight reduction.

## 2018-05-15 RX ORDER — METOPROLOL SUCCINATE 50 MG/1
TABLET, EXTENDED RELEASE ORAL
Qty: 30 TABLET | Refills: 2 | Status: SHIPPED | OUTPATIENT
Start: 2018-05-15 | End: 2018-08-08 | Stop reason: SDUPTHER

## 2018-06-04 DIAGNOSIS — I10 UNCONTROLLED HYPERTENSION: ICD-10-CM

## 2018-06-04 RX ORDER — AMLODIPINE BESYLATE 2.5 MG/1
TABLET ORAL
Qty: 30 TABLET | Refills: 2 | Status: SHIPPED | OUTPATIENT
Start: 2018-06-04 | End: 2018-08-28 | Stop reason: SDUPTHER

## 2018-08-09 RX ORDER — METOPROLOL SUCCINATE 50 MG/1
TABLET, EXTENDED RELEASE ORAL
Qty: 30 TABLET | Refills: 3 | Status: SHIPPED | OUTPATIENT
Start: 2018-08-09 | End: 2018-12-03 | Stop reason: SDUPTHER

## 2018-08-28 DIAGNOSIS — I10 UNCONTROLLED HYPERTENSION: ICD-10-CM

## 2018-08-29 RX ORDER — AMLODIPINE BESYLATE 2.5 MG/1
TABLET ORAL
Qty: 30 TABLET | Refills: 2 | Status: SHIPPED | OUTPATIENT
Start: 2018-08-29 | End: 2018-12-03 | Stop reason: SDUPTHER

## 2018-12-03 DIAGNOSIS — I10 UNCONTROLLED HYPERTENSION: ICD-10-CM

## 2018-12-03 RX ORDER — AMLODIPINE BESYLATE 2.5 MG/1
TABLET ORAL
Qty: 30 TABLET | Refills: 0 | Status: SHIPPED | OUTPATIENT
Start: 2018-12-03 | End: 2018-12-28 | Stop reason: SDUPTHER

## 2018-12-03 RX ORDER — METOPROLOL SUCCINATE 50 MG/1
TABLET, EXTENDED RELEASE ORAL
Qty: 30 TABLET | Refills: 0 | Status: SHIPPED | OUTPATIENT
Start: 2018-12-03 | End: 2018-12-28 | Stop reason: SDUPTHER

## 2018-12-07 DIAGNOSIS — Z12.11 COLON CANCER SCREENING: ICD-10-CM

## 2018-12-12 ENCOUNTER — TELEPHONE (OUTPATIENT)
Dept: FAMILY MEDICINE | Facility: CLINIC | Age: 58
End: 2018-12-12

## 2018-12-12 NOTE — TELEPHONE ENCOUNTER
----- Message from Ivana Waddell sent at 12/12/2018 12:07 PM CST -----  Type:  Patient Returning Call    Who Called:  Ptient  Who Left Message for Patient:  Na  Does the patient know what this is regarding?:  Yes, schedule an appt  Best Call Back Number:  [phone] (home) [phone] (work)    Additional Information:  Stating received a call yesterday regarding scheduling an appt

## 2018-12-28 DIAGNOSIS — I10 UNCONTROLLED HYPERTENSION: ICD-10-CM

## 2018-12-30 RX ORDER — AMLODIPINE BESYLATE 2.5 MG/1
TABLET ORAL
Qty: 90 TABLET | Refills: 1 | Status: SHIPPED | OUTPATIENT
Start: 2018-12-30 | End: 2019-05-07

## 2018-12-30 RX ORDER — METOPROLOL SUCCINATE 50 MG/1
TABLET, EXTENDED RELEASE ORAL
Qty: 90 TABLET | Refills: 1 | Status: SHIPPED | OUTPATIENT
Start: 2018-12-30 | End: 2019-06-27 | Stop reason: SDUPTHER

## 2019-01-02 ENCOUNTER — OFFICE VISIT (OUTPATIENT)
Dept: FAMILY MEDICINE | Facility: CLINIC | Age: 59
End: 2019-01-02
Payer: COMMERCIAL

## 2019-01-02 VITALS
BODY MASS INDEX: 26.61 KG/M2 | SYSTOLIC BLOOD PRESSURE: 158 MMHG | RESPIRATION RATE: 18 BRPM | HEART RATE: 77 BPM | DIASTOLIC BLOOD PRESSURE: 84 MMHG | OXYGEN SATURATION: 95 % | HEIGHT: 72 IN | WEIGHT: 196.44 LBS | TEMPERATURE: 98 F

## 2019-01-02 DIAGNOSIS — I10 WHITE COAT SYNDROME WITH DIAGNOSIS OF HYPERTENSION: ICD-10-CM

## 2019-01-02 DIAGNOSIS — E87.1 HYPONATREMIA: ICD-10-CM

## 2019-01-02 DIAGNOSIS — Z23 NEED FOR INFLUENZA VACCINATION: ICD-10-CM

## 2019-01-02 DIAGNOSIS — E66.3 OVERWEIGHT (BMI 25.0-29.9): ICD-10-CM

## 2019-01-02 DIAGNOSIS — Z23 NEED FOR 23-POLYVALENT PNEUMOCOCCAL POLYSACCHARIDE VACCINE: ICD-10-CM

## 2019-01-02 DIAGNOSIS — Z12.11 COLON CANCER SCREENING: ICD-10-CM

## 2019-01-02 DIAGNOSIS — I10 ESSENTIAL HYPERTENSION: Primary | ICD-10-CM

## 2019-01-02 DIAGNOSIS — Z01.89 ENCOUNTER FOR ROUTINE LABORATORY TESTING: ICD-10-CM

## 2019-01-02 DIAGNOSIS — Z78.9 ALCOHOL USE: ICD-10-CM

## 2019-01-02 DIAGNOSIS — Z12.5 PROSTATE CANCER SCREENING: ICD-10-CM

## 2019-01-02 PROCEDURE — 90472 IMMUNIZATION ADMIN EACH ADD: CPT | Mod: S$GLB,,, | Performed by: INTERNAL MEDICINE

## 2019-01-02 PROCEDURE — 90686 IIV4 VACC NO PRSV 0.5 ML IM: CPT | Mod: S$GLB,,, | Performed by: INTERNAL MEDICINE

## 2019-01-02 PROCEDURE — 99214 OFFICE O/P EST MOD 30 MIN: CPT | Mod: 25,S$GLB,, | Performed by: INTERNAL MEDICINE

## 2019-01-02 PROCEDURE — 99999 PR PBB SHADOW E&M-EST. PATIENT-LVL III: CPT | Mod: PBBFAC,,, | Performed by: INTERNAL MEDICINE

## 2019-01-02 PROCEDURE — 90471 FLU VACCINE (QUAD) GREATER THAN OR EQUAL TO 3YO PRESERVATIVE FREE IM: ICD-10-PCS | Mod: S$GLB,,, | Performed by: INTERNAL MEDICINE

## 2019-01-02 PROCEDURE — 99999 PR PBB SHADOW E&M-EST. PATIENT-LVL III: ICD-10-PCS | Mod: PBBFAC,,, | Performed by: INTERNAL MEDICINE

## 2019-01-02 PROCEDURE — 90686 FLU VACCINE (QUAD) GREATER THAN OR EQUAL TO 3YO PRESERVATIVE FREE IM: ICD-10-PCS | Mod: S$GLB,,, | Performed by: INTERNAL MEDICINE

## 2019-01-02 PROCEDURE — 90472 PNEUMOCOCCAL POLYSACCHARIDE VACCINE 23-VALENT =>2YO SQ IM: ICD-10-PCS | Mod: S$GLB,,, | Performed by: INTERNAL MEDICINE

## 2019-01-02 PROCEDURE — 90471 IMMUNIZATION ADMIN: CPT | Mod: S$GLB,,, | Performed by: INTERNAL MEDICINE

## 2019-01-02 PROCEDURE — 90732 PNEUMOCOCCAL POLYSACCHARIDE VACCINE 23-VALENT =>2YO SQ IM: ICD-10-PCS | Mod: S$GLB,,, | Performed by: INTERNAL MEDICINE

## 2019-01-02 PROCEDURE — 90732 PPSV23 VACC 2 YRS+ SUBQ/IM: CPT | Mod: S$GLB,,, | Performed by: INTERNAL MEDICINE

## 2019-01-02 PROCEDURE — 99214 PR OFFICE/OUTPT VISIT, EST, LEVL IV, 30-39 MIN: ICD-10-PCS | Mod: 25,S$GLB,, | Performed by: INTERNAL MEDICINE

## 2019-01-02 NOTE — PROGRESS NOTES
Subjective:       Patient ID: Marshall Victor is a 58 y.o. male.    Chief Complaint: Annual Exam    HPI  Here today for reassessment; annual to be rescheduled. Labs not done; will get on f/u.     Uncontrolled hypertension:  Blood pressure at home with monitoring has been averaging 120-130/75-80; he claims he watches his salt intake.  Caffeine intake:  16 oz of coffee each morning with 12 oz of coffee in the late afternoon.  He also has tea during the day. Blood pressure today here manually is elevated at 158/84; he just completed 16 oz of coffee prior to the visit of note.  On amlodipine and metoprolol succinate.    Hyponatremia:  He voids free water during the day.  He has been advised to reduce his caffeine intake.    Alcohol use:  Claims he has decreased his alcohol intake which will also help his hyponatremia; drinks about 5 a week however drinks heavy with the [place] game around 10 drinks.  Advised of need to reduce his alcohol intake further particularly with the [employer] games.     Overweight:  Exercises at least 3 times a week walks 5 miles at a time and uses weights.  He is trying small portions.    Prostate cancer screening:  Discussed with patient at length and desires PSA be performed.  Colon cancer screening:  The patient is 58 has not had his initial total colonoscopy; discussed with patient total colonoscopy declined     Thrombocytosis:  Platelet count minimally reduced at 383; will follow his CBC with platelet count; repeat with follow-up; may easily be due to mild inflammation.    Labs are ordered for follow-up; iFOBT for the stool is pending.    Review of Systems   Constitutional: Negative for activity change and unexpected weight change.   HENT: Negative for hearing loss, rhinorrhea and trouble swallowing.    Eyes: Negative for discharge and visual disturbance.   Respiratory: Negative for chest tightness and wheezing.    Cardiovascular: Negative for chest pain and palpitations.   Gastrointestinal:  Negative for blood in stool, constipation, diarrhea and vomiting.   Endocrine: Negative for polydipsia and polyuria.   Genitourinary: Negative for difficulty urinating, hematuria and urgency.   Musculoskeletal: Negative for arthralgias, joint swelling and neck pain.   Skin: Positive for rash.        Sees derm for hand eczema.    Allergic/Immunologic: Negative for environmental allergies and food allergies.   Neurological: Negative for weakness and headaches.   Psychiatric/Behavioral: Negative for confusion and dysphoric mood.       Objective:      Vitals:    01/02/19 0841   BP: (!) 158/84   Pulse: 77   Resp: 18   Temp: 97.6 °F (36.4 °C)   TempSrc: Oral   SpO2: 95%   Weight: 89.1 kg (196 lb 6.9 oz)   Height: 6' (1.829 m)     Body mass index is 26.64 kg/m².    Physical Exam   Constitutional: He is oriented to person, place, and time. He appears well-developed and well-nourished.   HENT:   Head: Normocephalic and atraumatic.   Eyes: EOM are normal.   Neck: Normal range of motion. Neck supple. No thyromegaly present.   No carotid bruits heard.   Cardiovascular: Normal rate, regular rhythm and normal heart sounds. Exam reveals no gallop.   No murmur heard.  Pulmonary/Chest: Effort normal and breath sounds normal. No respiratory distress. He has no wheezes. He has no rales.   Abdominal: Soft. Bowel sounds are normal. He exhibits no distension. There is no tenderness. There is no rebound and no guarding.   Musculoskeletal: Normal range of motion. He exhibits no edema.   Lymphadenopathy:     He has no cervical adenopathy.   Neurological: He is alert and oriented to person, place, and time.   Moves all 4 extremities fine.   Skin: No rash noted.   Psychiatric: He has a normal mood and affect. His behavior is normal. Thought content normal.   Vitals reviewed.      Assessment:       1. Essential hypertension    2. Encounter for routine laboratory testing    3. White coat syndrome with diagnosis of hypertension    4.  Hyponatremia    5. Overweight (BMI 25.0-29.9)    6. Alcohol use    7. Need for 23-polyvalent pneumococcal polysaccharide vaccine    8. Need for influenza vaccination    9. Prostate cancer screening    10. Colon cancer screening        Plan:       Essential hypertension. Maintain < 2 Gm Na a day diet, and monitor BP at home; keep a log. Same tx; on amlodipine and metoprolol succinate.  Needs to watch his salt intake closer as well as his caffeine and include regular exercise in his daily regimen.  -     CBC auto differential; Future; Expected date: 01/02/2019  -     Comprehensive metabolic panel; Future; Expected date: 01/02/2019  -     T4, free; Future; Expected date: 01/02/2019  -     TSH; Future; Expected date: 01/02/2019  -     Urinalysis; Future; Expected date: 01/02/2019  -     Lipid panel; Future; Expected date: 01/02/2019    Encounter for routine laboratory testing  -     CBC auto differential; Future; Expected date: 01/02/2019  -     Comprehensive metabolic panel; Future; Expected date: 01/02/2019  -     T4, free; Future; Expected date: 01/02/2019  -     TSH; Future; Expected date: 01/02/2019  -     Urinalysis; Future; Expected date: 01/02/2019  -     Lipid panel; Future; Expected date: 01/02/2019  -     PSA, Screening; Future; Expected date: 01/02/2019    White coat syndrome with diagnosis of hypertension    Hyponatremia; decrease alcohol and caffeine intake. Limit free water during the day to about 800 cc.   -     Comprehensive metabolic panel; Future; Expected date: 01/02/2019    Overweight (BMI 25.0-29.9). Weight bearing exercises, continue calcium and vit D supplements. DEXA scabn at 2 yr intervals as needed.  -     Comprehensive metabolic panel; Future; Expected date: 01/02/2019  -     T4, free; Future; Expected date: 01/02/2019  -     TSH; Future; Expected date: 01/02/2019    Alcohol use; decrease to avr of 5 a week.   -     CBC auto differential; Future; Expected date: 01/02/2019  -     Comprehensive  metabolic panel; Future; Expected date: 01/02/2019    Need for 23-polyvalent pneumococcal polysaccharide vaccine; PPSV-23 to be given today in office    Need for influenza vaccination; to be given today in office.    Prostate cancer screening  -     PSA, Screening; Future; Expected date: 01/02/2019    Colon cancer screening; declines TC; has never had one. Will do iFOBT.  -     Fecal Immunochemical Test (iFOBT); Future; Expected date: 01/02/2019

## 2019-01-02 NOTE — PATIENT INSTRUCTIONS
Essential hypertension. Maintain < 2 Gm Na a day diet, and monitor BP at home; keep a log. Same tx.  -     CBC auto differential; Future; Expected date: 01/02/2019  -     Comprehensive metabolic panel; Future; Expected date: 01/02/2019  -     T4, free; Future; Expected date: 01/02/2019  -     TSH; Future; Expected date: 01/02/2019  -     Urinalysis; Future; Expected date: 01/02/2019  -     Lipid panel; Future; Expected date: 01/02/2019    Encounter for routine laboratory testing  -     CBC auto differential; Future; Expected date: 01/02/2019  -     Comprehensive metabolic panel; Future; Expected date: 01/02/2019  -     T4, free; Future; Expected date: 01/02/2019  -     TSH; Future; Expected date: 01/02/2019  -     Urinalysis; Future; Expected date: 01/02/2019  -     Lipid panel; Future; Expected date: 01/02/2019  -     PSA, Screening; Future; Expected date: 01/02/2019    White coat syndrome with diagnosis of hypertension    Hyponatremia; decrease alcohol and caffeine intake. Limit free water during the day to about 800 cc.   -     Comprehensive metabolic panel; Future; Expected date: 01/02/2019    Overweight (BMI 25.0-29.9). Weight bearing exercises, continue calcium and vit D supplements. DEXA scabn at 2 yr intervals as needed.  -     Comprehensive metabolic panel; Future; Expected date: 01/02/2019  -     T4, free; Future; Expected date: 01/02/2019  -     TSH; Future; Expected date: 01/02/2019    Alcohol use; decrease to avr of 5 a week.   -     CBC auto differential; Future; Expected date: 01/02/2019  -     Comprehensive metabolic panel; Future; Expected date: 01/02/2019    Need for 23-polyvalent pneumococcal polysaccharide vaccine    Need for influenza vaccination    Prostate cancer screening  -     PSA, Screening; Future; Expected date: 01/02/2019    Colon cancer screening; declines TC; has never had one. Will do iFOBT.  -     Fecal Immunochemical Test (iFOBT); Future; Expected date: 01/02/2019

## 2019-01-11 ENCOUNTER — LAB VISIT (OUTPATIENT)
Dept: LAB | Facility: HOSPITAL | Age: 59
End: 2019-01-11
Attending: INTERNAL MEDICINE
Payer: COMMERCIAL

## 2019-01-11 DIAGNOSIS — Z78.9 ALCOHOL USE: ICD-10-CM

## 2019-01-11 DIAGNOSIS — Z12.5 PROSTATE CANCER SCREENING: ICD-10-CM

## 2019-01-11 DIAGNOSIS — Z01.89 ENCOUNTER FOR ROUTINE LABORATORY TESTING: ICD-10-CM

## 2019-01-11 DIAGNOSIS — E66.3 OVERWEIGHT (BMI 25.0-29.9): ICD-10-CM

## 2019-01-11 DIAGNOSIS — E87.1 HYPONATREMIA: ICD-10-CM

## 2019-01-11 DIAGNOSIS — I10 ESSENTIAL HYPERTENSION: ICD-10-CM

## 2019-01-11 LAB
ALBUMIN SERPL BCP-MCNC: 3.8 G/DL
ALP SERPL-CCNC: 53 U/L
ALT SERPL W/O P-5'-P-CCNC: 20 U/L
ANION GAP SERPL CALC-SCNC: 7 MMOL/L
AST SERPL-CCNC: 20 U/L
BASOPHILS # BLD AUTO: 0.08 K/UL
BASOPHILS NFR BLD: 1 %
BILIRUB SERPL-MCNC: 0.6 MG/DL
BUN SERPL-MCNC: 10 MG/DL
CALCIUM SERPL-MCNC: 9.3 MG/DL
CHLORIDE SERPL-SCNC: 103 MMOL/L
CHOLEST SERPL-MCNC: 178 MG/DL
CHOLEST/HDLC SERPL: 3.7 {RATIO}
CO2 SERPL-SCNC: 25 MMOL/L
COMPLEXED PSA SERPL-MCNC: 0.89 NG/ML
CREAT SERPL-MCNC: 1 MG/DL
DIFFERENTIAL METHOD: NORMAL
EOSINOPHIL # BLD AUTO: 0.1 K/UL
EOSINOPHIL NFR BLD: 1.4 %
ERYTHROCYTE [DISTWIDTH] IN BLOOD BY AUTOMATED COUNT: 12.5 %
EST. GFR  (AFRICAN AMERICAN): >60 ML/MIN/1.73 M^2
EST. GFR  (NON AFRICAN AMERICAN): >60 ML/MIN/1.73 M^2
GLUCOSE SERPL-MCNC: 97 MG/DL
HCT VFR BLD AUTO: 43 %
HDLC SERPL-MCNC: 48 MG/DL
HDLC SERPL: 27 %
HGB BLD-MCNC: 14.3 G/DL
IMM GRANULOCYTES # BLD AUTO: 0.03 K/UL
IMM GRANULOCYTES NFR BLD AUTO: 0.4 %
LDLC SERPL CALC-MCNC: 112.6 MG/DL
LYMPHOCYTES # BLD AUTO: 2.7 K/UL
LYMPHOCYTES NFR BLD: 31.7 %
MCH RBC QN AUTO: 29.7 PG
MCHC RBC AUTO-ENTMCNC: 33.3 G/DL
MCV RBC AUTO: 89 FL
MONOCYTES # BLD AUTO: 0.9 K/UL
MONOCYTES NFR BLD: 10.6 %
NEUTROPHILS # BLD AUTO: 4.6 K/UL
NEUTROPHILS NFR BLD: 54.9 %
NONHDLC SERPL-MCNC: 130 MG/DL
NRBC BLD-RTO: 0 /100 WBC
PLATELET # BLD AUTO: 328 K/UL
PMV BLD AUTO: 9.9 FL
POTASSIUM SERPL-SCNC: 4.2 MMOL/L
PROT SERPL-MCNC: 7.1 G/DL
RBC # BLD AUTO: 4.81 M/UL
SODIUM SERPL-SCNC: 135 MMOL/L
T4 FREE SERPL-MCNC: 1.19 NG/DL
TRIGL SERPL-MCNC: 87 MG/DL
TSH SERPL DL<=0.005 MIU/L-ACNC: 0.73 UIU/ML
WBC # BLD AUTO: 8.37 K/UL

## 2019-01-11 PROCEDURE — 84439 ASSAY OF FREE THYROXINE: CPT

## 2019-01-11 PROCEDURE — 84153 ASSAY OF PSA TOTAL: CPT

## 2019-01-11 PROCEDURE — 80053 COMPREHEN METABOLIC PANEL: CPT

## 2019-01-11 PROCEDURE — 80061 LIPID PANEL: CPT

## 2019-01-11 PROCEDURE — 84443 ASSAY THYROID STIM HORMONE: CPT

## 2019-01-11 PROCEDURE — 36415 COLL VENOUS BLD VENIPUNCTURE: CPT | Mod: PN

## 2019-01-11 PROCEDURE — 85025 COMPLETE CBC W/AUTO DIFF WBC: CPT

## 2019-03-25 ENCOUNTER — TELEPHONE (OUTPATIENT)
Dept: FAMILY MEDICINE | Facility: CLINIC | Age: 59
End: 2019-03-25

## 2019-03-25 DIAGNOSIS — R31.0 MACROSCOPIC HEMATURIA: Primary | ICD-10-CM

## 2019-03-25 NOTE — TELEPHONE ENCOUNTER
Please ask patient to schedule follow-up appointment for reassessment and go over his labs; he is past due; would also like to have him pass by lab and repeat his urinalysis as he had 1+ blood on his dipstick, for routine follow-up

## 2019-03-26 NOTE — TELEPHONE ENCOUNTER
Notified patient of results and patient states that he will swing by and do urine and then call to set up a follow up appointment.

## 2019-03-29 ENCOUNTER — LAB VISIT (OUTPATIENT)
Dept: LAB | Facility: HOSPITAL | Age: 59
End: 2019-03-29
Attending: INTERNAL MEDICINE
Payer: COMMERCIAL

## 2019-03-29 DIAGNOSIS — R31.0 MACROSCOPIC HEMATURIA: ICD-10-CM

## 2019-03-29 LAB
BILIRUB UR QL STRIP: NEGATIVE
CLARITY UR REFRACT.AUTO: CLEAR
COLOR UR AUTO: YELLOW
GLUCOSE UR QL STRIP: NEGATIVE
HGB UR QL STRIP: NEGATIVE
KETONES UR QL STRIP: NEGATIVE
LEUKOCYTE ESTERASE UR QL STRIP: NEGATIVE
MICROSCOPIC COMMENT: NORMAL
NITRITE UR QL STRIP: NEGATIVE
PH UR STRIP: 7 [PH] (ref 5–8)
PROT UR QL STRIP: NEGATIVE
SP GR UR STRIP: 1.01 (ref 1–1.03)
URN SPEC COLLECT METH UR: NORMAL

## 2019-03-29 PROCEDURE — 81001 URINALYSIS AUTO W/SCOPE: CPT

## 2019-04-30 ENCOUNTER — TELEPHONE (OUTPATIENT)
Dept: FAMILY MEDICINE | Facility: CLINIC | Age: 59
End: 2019-04-30

## 2019-04-30 NOTE — TELEPHONE ENCOUNTER
----- Message from Ramesh Kapadia sent at 4/30/2019  4:28 PM CDT -----  Contact: Patient  Type:  Patient Returning Call    Who Called:  Patient  Who Left Message for Patient:  Bridgette Torres  Does the patient know what this is regarding?:  uncertain  Best Call Back Number: [phone]  Additional Information:  NA

## 2019-05-07 ENCOUNTER — OFFICE VISIT (OUTPATIENT)
Dept: FAMILY MEDICINE | Facility: CLINIC | Age: 59
End: 2019-05-07
Payer: COMMERCIAL

## 2019-05-07 VITALS
BODY MASS INDEX: 26.35 KG/M2 | WEIGHT: 194.56 LBS | RESPIRATION RATE: 14 BRPM | HEART RATE: 77 BPM | OXYGEN SATURATION: 97 % | HEIGHT: 72 IN | DIASTOLIC BLOOD PRESSURE: 80 MMHG | TEMPERATURE: 98 F | SYSTOLIC BLOOD PRESSURE: 140 MMHG

## 2019-05-07 DIAGNOSIS — E78.00 PURE HYPERCHOLESTEROLEMIA: ICD-10-CM

## 2019-05-07 DIAGNOSIS — I10 UNCONTROLLED HYPERTENSION: Primary | ICD-10-CM

## 2019-05-07 DIAGNOSIS — Z12.5 PROSTATE CANCER SCREENING: ICD-10-CM

## 2019-05-07 DIAGNOSIS — E66.3 OVERWEIGHT (BMI 25.0-29.9): ICD-10-CM

## 2019-05-07 DIAGNOSIS — R31.0 MACROSCOPIC HEMATURIA: ICD-10-CM

## 2019-05-07 DIAGNOSIS — E87.1 HYPONATREMIA: ICD-10-CM

## 2019-05-07 DIAGNOSIS — I10 WHITE COAT SYNDROME WITH DIAGNOSIS OF HYPERTENSION: ICD-10-CM

## 2019-05-07 PROCEDURE — 99999 PR PBB SHADOW E&M-EST. PATIENT-LVL III: CPT | Mod: PBBFAC,,, | Performed by: INTERNAL MEDICINE

## 2019-05-07 PROCEDURE — 99999 PR PBB SHADOW E&M-EST. PATIENT-LVL III: ICD-10-PCS | Mod: PBBFAC,,, | Performed by: INTERNAL MEDICINE

## 2019-05-07 PROCEDURE — 99214 OFFICE O/P EST MOD 30 MIN: CPT | Mod: S$GLB,,, | Performed by: INTERNAL MEDICINE

## 2019-05-07 PROCEDURE — 99214 PR OFFICE/OUTPT VISIT, EST, LEVL IV, 30-39 MIN: ICD-10-PCS | Mod: S$GLB,,, | Performed by: INTERNAL MEDICINE

## 2019-05-07 RX ORDER — AMLODIPINE BESYLATE 5 MG/1
5 TABLET ORAL DAILY
Qty: 90 TABLET | Refills: 1 | Status: SHIPPED | OUTPATIENT
Start: 2019-05-07 | End: 2019-06-27

## 2019-05-07 NOTE — PROGRESS NOTES
Subjective:       Patient ID: Marshall Victor is a 58 y.o. male.    Chief Complaint: Hypertension (follow up on BP and routine assessment of well being )    HPI  Here today for reassessment go over his labs and for blood pressure management  Hypertension; watches his salt intake; blood pressure here manually is 140/80; blood pressure at home has been averaging- about the same; last night 138/78      ; patient is on Norvasc 2.5 mg daily and metoprolol 50 mg as succinate daily. Drinks a lot of coffee during the day. Limit caffeine; increase amlodipine to 5 mg a day.   Overweight:  BMI 26.39; goal regular exercise 5 times a week with minimum 25-30 minutes as well as caloric restriction to help weight reduction. At 3x a week at present  Macroscopic hematuria resolved; had done crossfit at time. No gross hematuria. No hx of renal stones.   Prostate cancer screen; discussed; desires to be checked w PSA yearly; PSA  1/11/19 PSA 0.89; repeat in 1/11/20.   Total time: 7183-5356; >50% time spent on discussion, counseling, and review; labs ordered for f/u; meds addressed and filled.     Review of Systems   Constitutional: Negative for activity change and unexpected weight change.   HENT: Negative for hearing loss, rhinorrhea and trouble swallowing.    Eyes: Negative for discharge and visual disturbance.   Respiratory: Negative for chest tightness and wheezing.    Cardiovascular: Negative for chest pain and palpitations.   Gastrointestinal: Negative for blood in stool, constipation, diarrhea and vomiting.   Endocrine: Negative for polydipsia and polyuria.   Genitourinary: Negative for difficulty urinating, hematuria and urgency.   Musculoskeletal: Negative for arthralgias, joint swelling and neck pain.   Skin: Negative for rash.   Allergic/Immunologic: Negative for environmental allergies and food allergies.   Neurological: Negative for weakness and headaches.   Hematological: Negative for adenopathy. Does not bruise/bleed  easily.   Psychiatric/Behavioral: Negative for confusion and dysphoric mood.       Objective:      Vitals:    05/07/19 0914   BP: (!) 140/80   Pulse: 77   Resp: 14   Temp: 98.3 °F (36.8 °C)   TempSrc: Oral   SpO2: 97%   Weight: 88.3 kg (194 lb 8.9 oz)   Height: 6' (1.829 m)     Body mass index is 26.39 kg/m².    Physical Exam   Constitutional: He is oriented to person, place, and time. He appears well-developed and well-nourished.   HENT:   Head: Normocephalic and atraumatic.   Eyes: EOM are normal.   Neck: Normal range of motion. Neck supple. No thyromegaly present.   No carotid bruits heard   Cardiovascular: Normal rate, regular rhythm and normal heart sounds. Exam reveals no gallop.   No murmur heard.  Pulmonary/Chest: Effort normal and breath sounds normal. No respiratory distress. He has no wheezes. He has no rales.   Abdominal: Soft. Bowel sounds are normal. He exhibits no distension. There is no tenderness. There is no rebound and no guarding.   Musculoskeletal: Normal range of motion. He exhibits no edema.   Lymphadenopathy:     He has no cervical adenopathy.   Neurological: He is alert and oriented to person, place, and time.   Moves all 4 extremities fine.   Skin: No rash noted.   Psychiatric: He has a normal mood and affect. His behavior is normal. Thought content normal.   Vitals reviewed.      Assessment:       1. Uncontrolled hypertension    2. White coat syndrome with diagnosis of hypertension    3. Pure hypercholesterolemia    4. Prostate cancer screening    5. Macroscopic hematuria    6. Hyponatremia    7. Overweight (BMI 25.0-29.9)        Plan:       Uncontrolled hypertension.Maintain < 2 Gm Na a day diet, and monitor BP at home; keep a log. Cont metoprolol. Increase amlodipine from 2.5 mg to 5 mg a day. Limit caffeine intake. Regular exercise.   -     amLODIPine (NORVASC) 5 MG tablet; Take 1 tablet (5 mg total) by mouth once daily.  Dispense: 90 tablet; Refill: 1    White coat syndrome with  diagnosis of hypertension    Pure hypercholesterolemia.Maintain low fat high fiber diet, exercise regularly.  -     Lipid panel; Future; Expected date: 05/07/2019    Prostate cancer screening; yearly PSA due 1/11/20    Macroscopic hematuria; has cleared. Suspect due to intense exercise at time.     Hyponatremia; decrease water intake but not total fluids.   -     Basic metabolic panel; Future; Expected date: 05/07/2019    Overweight; Caloric restriction w regular exercise and weight reduction.

## 2019-05-07 NOTE — PATIENT INSTRUCTIONS
Uncontrolled hypertension.Maintain < 2 Gm Na a day diet, and monitor BP at home; keep a log. Cont metoprolol. Increase amlodipine from 2.5 mg to 5 mg a day. Limit caffeine intake. Regular exercise.   -     amLODIPine (NORVASC) 5 MG tablet; Take 1 tablet (5 mg total) by mouth once daily.  Dispense: 90 tablet; Refill: 1    White coat syndrome with diagnosis of hypertension    Pure hypercholesterolemia.Maintain low fat high fiber diet, exercise regularly.  -     Lipid panel; Future; Expected date: 05/07/2019    Prostate cancer screening; yearly PSA due 1/11/20    Macroscopic hematuria; has cleared. Suspect due to intense exercise at time.     Hyponatremia; decrease water intake but not total fluids.   -     Basic metabolic panel; Future; Expected date: 05/07/2019    Overweight; Caloric restriction w regular exercise and weight reduction.

## 2019-06-27 DIAGNOSIS — I10 UNCONTROLLED HYPERTENSION: ICD-10-CM

## 2019-06-27 RX ORDER — AMLODIPINE BESYLATE 5 MG/1
5 TABLET ORAL DAILY
Qty: 90 TABLET | Refills: 1 | Status: SHIPPED | OUTPATIENT
Start: 2019-06-27 | End: 2020-03-01

## 2019-06-27 RX ORDER — AMLODIPINE BESYLATE 2.5 MG/1
TABLET ORAL
Qty: 90 TABLET | Refills: 0 | OUTPATIENT
Start: 2019-06-27

## 2019-06-27 RX ORDER — METOPROLOL SUCCINATE 50 MG/1
TABLET, EXTENDED RELEASE ORAL
Qty: 90 TABLET | Refills: 0 | Status: SHIPPED | OUTPATIENT
Start: 2019-06-27 | End: 2019-10-01 | Stop reason: SDUPTHER

## 2019-07-24 ENCOUNTER — PATIENT OUTREACH (OUTPATIENT)
Dept: ADMINISTRATIVE | Facility: HOSPITAL | Age: 59
End: 2019-07-24

## 2019-07-31 ENCOUNTER — LAB VISIT (OUTPATIENT)
Dept: LAB | Facility: HOSPITAL | Age: 59
End: 2019-07-31
Attending: INTERNAL MEDICINE
Payer: COMMERCIAL

## 2019-07-31 DIAGNOSIS — E78.00 PURE HYPERCHOLESTEROLEMIA: ICD-10-CM

## 2019-07-31 DIAGNOSIS — E87.1 HYPONATREMIA: ICD-10-CM

## 2019-07-31 LAB
ANION GAP SERPL CALC-SCNC: 8 MMOL/L (ref 8–16)
BUN SERPL-MCNC: 11 MG/DL (ref 6–20)
CALCIUM SERPL-MCNC: 9.6 MG/DL (ref 8.7–10.5)
CHLORIDE SERPL-SCNC: 101 MMOL/L (ref 95–110)
CHOLEST SERPL-MCNC: 205 MG/DL (ref 120–199)
CHOLEST/HDLC SERPL: 4.2 {RATIO} (ref 2–5)
CO2 SERPL-SCNC: 26 MMOL/L (ref 23–29)
CREAT SERPL-MCNC: 1 MG/DL (ref 0.5–1.4)
EST. GFR  (AFRICAN AMERICAN): >60 ML/MIN/1.73 M^2
EST. GFR  (NON AFRICAN AMERICAN): >60 ML/MIN/1.73 M^2
GLUCOSE SERPL-MCNC: 95 MG/DL (ref 70–110)
HDLC SERPL-MCNC: 49 MG/DL (ref 40–75)
HDLC SERPL: 23.9 % (ref 20–50)
LDLC SERPL CALC-MCNC: 129.2 MG/DL (ref 63–159)
NONHDLC SERPL-MCNC: 156 MG/DL
POTASSIUM SERPL-SCNC: 4.2 MMOL/L (ref 3.5–5.1)
SODIUM SERPL-SCNC: 135 MMOL/L (ref 136–145)
TRIGL SERPL-MCNC: 134 MG/DL (ref 30–150)

## 2019-07-31 PROCEDURE — 36415 COLL VENOUS BLD VENIPUNCTURE: CPT | Mod: PN

## 2019-07-31 PROCEDURE — 80061 LIPID PANEL: CPT

## 2019-07-31 PROCEDURE — 80048 BASIC METABOLIC PNL TOTAL CA: CPT

## 2019-08-07 ENCOUNTER — TELEPHONE (OUTPATIENT)
Dept: FAMILY MEDICINE | Facility: CLINIC | Age: 59
End: 2019-08-07

## 2019-08-07 NOTE — TELEPHONE ENCOUNTER
----- Message from Monie Andrew sent at 8/7/2019  1:13 PM CDT -----  Contact: RAINE WARREN [[id number]]  Name of Who is Calling: RAINE WARREN [[id number]]      What is the request in detail: patient would like to be seen on Friday morning for a follow up visit. Please call     Can the clinic reply by [facility]: no    What Number to Call Back if not in [facility]: [phone]

## 2019-08-09 ENCOUNTER — OFFICE VISIT (OUTPATIENT)
Dept: FAMILY MEDICINE | Facility: CLINIC | Age: 59
End: 2019-08-09
Payer: COMMERCIAL

## 2019-08-09 VITALS
HEART RATE: 76 BPM | WEIGHT: 193.25 LBS | BODY MASS INDEX: 26.18 KG/M2 | SYSTOLIC BLOOD PRESSURE: 142 MMHG | OXYGEN SATURATION: 98 % | DIASTOLIC BLOOD PRESSURE: 80 MMHG | TEMPERATURE: 98 F | HEIGHT: 72 IN

## 2019-08-09 DIAGNOSIS — Z87.2 HISTORY OF CONTACT DERMATITIS: ICD-10-CM

## 2019-08-09 DIAGNOSIS — E78.49 OTHER HYPERLIPIDEMIA: ICD-10-CM

## 2019-08-09 DIAGNOSIS — E87.1 HYPONATREMIA: ICD-10-CM

## 2019-08-09 DIAGNOSIS — T88.7XXA SIDE EFFECT OF DRUG: ICD-10-CM

## 2019-08-09 DIAGNOSIS — I10 WHITE COAT SYNDROME WITH DIAGNOSIS OF HYPERTENSION: ICD-10-CM

## 2019-08-09 DIAGNOSIS — I10 ESSENTIAL HYPERTENSION: Primary | ICD-10-CM

## 2019-08-09 PROCEDURE — 99999 PR PBB SHADOW E&M-EST. PATIENT-LVL III: CPT | Mod: PBBFAC,,, | Performed by: INTERNAL MEDICINE

## 2019-08-09 PROCEDURE — 99999 PR PBB SHADOW E&M-EST. PATIENT-LVL III: ICD-10-PCS | Mod: PBBFAC,,, | Performed by: INTERNAL MEDICINE

## 2019-08-09 PROCEDURE — 99213 PR OFFICE/OUTPT VISIT, EST, LEVL III, 20-29 MIN: ICD-10-PCS | Mod: S$GLB,,, | Performed by: INTERNAL MEDICINE

## 2019-08-09 PROCEDURE — 99213 OFFICE O/P EST LOW 20 MIN: CPT | Mod: S$GLB,,, | Performed by: INTERNAL MEDICINE

## 2019-08-09 RX ORDER — FLUOCINOLONE ACETONIDE 0.1 MG/G
CREAM TOPICAL 2 TIMES DAILY
Qty: 15 G | Refills: 1 | Status: SHIPPED | OUTPATIENT
Start: 2019-08-09 | End: 2023-12-18

## 2019-08-09 NOTE — PATIENT INSTRUCTIONS
Essential hypertension.Maintain < 2 Gm Na a day diet, and monitor BP at home; keep a log. Cont amlodipine, and metoprolol.   Decrease caffeine as discussed.     White coat syndrome with diagnosis of hypertension; limit caffeine morning of visit.    Hyponatremia; limit water intake; drink more sport drinks.     Other hyperlipidemia. Maintain low fat high fiber diet, exercise regularly. Add metamucil daily;     History of contact dermatitis  -     fluocinolone (VANOS) 0.01 % cream; Apply topically 2 (two) times daily. Apply 1-2x daily for dermatitis flareup; max 10 days w flareup.  Dispense: 15 g; Refill: 1

## 2019-08-09 NOTE — PROGRESS NOTES
Subjective:       Patient ID: Marshall Victor is a 59 y.o. male.    Chief Complaint: Follow-up (6 month visit)    HPI  overall patient has been doing fine here today for reassessment and go over his labs.  Essential hypertension:  Watches his salt intake.  Blood pressure manually here is elevated at 144/78.  Patient is on amlodipine and metoprolol succinate.  Blood pressure at home has been averaging- 120's/80 or less. Rushing to get her this morning as well. Caffeine in am large cup of coffee. To cut in 1/2 or go to 1/2 cut or decaff to help BP.   Other hyperlipidemia; discussed need for weight reduction as well as regular exercise to help both his blood pressure and his cholesterol levels and his need to be on a low-salt low caffeine low-fat high-fiber diet; regular exercise long-term wise will benefit cholesterol and blood pressure as well.Slightly worse at  w ; goal <100.  Hyponatremia:  Watching his water intake will follow sodium levels with renal chemistry conservatively; stable at 135.  Overweight:  BMI 26.21; weight 1949 oz on last follow-up visit; today 193 lb 4 oz; goal is regular exercise 5 times a week minimum 25-30 minutes with caloric restriction to help his weight come down and his blood pressure as well  Hx of hand contact dermatitis: would like vanos topical script refilled for flareups; advised not to exceed 10-14 days per flareup.     Review of Systems   Constitutional: Negative for appetite change and unexpected weight change.   HENT: Negative for congestion, postnasal drip, rhinorrhea and sinus pressure.         Denies seasonal allergies, or perennial allergies   Eyes: Negative for discharge and itching.   Respiratory: Negative for cough, chest tightness, shortness of breath and wheezing.    Cardiovascular: Negative for chest pain, palpitations and leg swelling.   Gastrointestinal: Negative for abdominal pain, blood in stool, constipation, diarrhea, nausea and vomiting.    Endocrine: Negative for polydipsia, polyphagia and polyuria.   Genitourinary: Negative for dysuria and hematuria.   Musculoskeletal: Negative for arthralgias and myalgias.   Skin: Negative for rash.   Allergic/Immunologic: Negative for environmental allergies and food allergies.   Neurological: Negative for tremors, seizures and headaches.   Hematological: Negative for adenopathy. Does not bruise/bleed easily.   Psychiatric/Behavioral:        Denies anxiety or depression.       Objective:      Vitals:    08/09/19 0824 08/09/19 0851   BP: (!) 144/78 (!) 142/80   BP Location: Right arm Right arm   Patient Position: Sitting Sitting   BP Method: Large (Manual) Large (Manual)   Pulse: 78 76   Temp: 97.9 °F (36.6 °C)    TempSrc: Oral    SpO2: 99% 98%   Weight: 87.6 kg (193 lb 3.7 oz)    Height: 6' (1.829 m)      Body mass index is 26.21 kg/m².    Physical Exam   Constitutional: He is oriented to person, place, and time. He appears well-developed and well-nourished.   HENT:   Head: Normocephalic and atraumatic.   Eyes: EOM are normal.   Neck: Normal range of motion. Neck supple. No thyromegaly present.   No carotid bruits heard   Cardiovascular: Normal rate, regular rhythm and normal heart sounds. Exam reveals no gallop.   No murmur heard.  Pulmonary/Chest: Effort normal and breath sounds normal. No respiratory distress. He has no wheezes. He has no rales.   Abdominal: Soft. Bowel sounds are normal. He exhibits no distension. There is no tenderness. There is no rebound and no guarding.   Musculoskeletal: Normal range of motion. He exhibits no edema.   Lymphadenopathy:     He has no cervical adenopathy.   Neurological: He is alert and oriented to person, place, and time.   Moves all 4 extremities fine.   Skin: No rash noted.   Psychiatric: He has a normal mood and affect. His behavior is normal. Thought content normal.   Vitals reviewed.      Assessment:       1. Essential hypertension    2. White coat syndrome with  diagnosis of hypertension    3. Side effect of drug    4. Other hyperlipidemia    5. Hyponatremia    6. History of contact dermatitis        Plan:       Essential hypertension.Maintain < 2 Gm Na a day diet, and monitor BP at home; keep a log. Cont amlodipine, and metoprolol.   Decrease caffeine as discussed.   -     Basic metabolic panel; Future; Expected date: 08/09/2019  -     Lipid panel; Future; Expected date: 08/09/2019  -     Urinalysis; Future; Expected date: 08/09/2019    White coat syndrome with diagnosis of hypertension  -     Basic metabolic panel; Future; Expected date: 08/09/2019  -     Lipid panel; Future; Expected date: 08/09/2019    Side effect of drug:  Caffeine potentially may be affecting his blood pressure; advised to decrease his caffeine intake; or go to 1/2 cut if not decaffeinated.    Hyponatremia; limit water intake; drink more sport drinks.   -     Basic metabolic panel; Future; Expected date: 08/09/2019    Other hyperlipidemia. Maintain low fat high fiber diet, exercise regularly. Add metamucil daily;   -     Lipid panel; Future; Expected date: 08/09/2019    History of contact dermatitis  -     fluocinolone (VANOS) 0.01 % cream; Apply topically 2 (two) times daily. Apply 1-2x daily for dermatitis flareup; max 10 days w flareup.  Dispense: 15 g; Refill: 1

## 2019-08-15 ENCOUNTER — PATIENT MESSAGE (OUTPATIENT)
Dept: FAMILY MEDICINE | Facility: CLINIC | Age: 59
End: 2019-08-15

## 2019-08-16 NOTE — TELEPHONE ENCOUNTER
Please notify patient that the attached blood pressure reading of 125/74 with a pulse of 63 is revealing an excellent blood pressure reading.  Please continue present management and continue to monitor his blood pressure at home and call us for any questions or elevation of his blood pressure; please keep his follow-up appointment with us for reassessment

## 2019-08-22 ENCOUNTER — CLINICAL SUPPORT (OUTPATIENT)
Dept: FAMILY MEDICINE | Facility: CLINIC | Age: 59
End: 2019-08-22
Payer: COMMERCIAL

## 2019-08-22 VITALS — SYSTOLIC BLOOD PRESSURE: 150 MMHG | DIASTOLIC BLOOD PRESSURE: 70 MMHG

## 2019-08-22 PROCEDURE — 99499 UNLISTED E&M SERVICE: CPT | Mod: S$GLB,,, | Performed by: INTERNAL MEDICINE

## 2019-08-22 PROCEDURE — 99999 PR PBB SHADOW E&M-EST. PATIENT-LVL I: CPT | Mod: PBBFAC,,,

## 2019-08-22 PROCEDURE — 99999 PR PBB SHADOW E&M-EST. PATIENT-LVL I: ICD-10-PCS | Mod: PBBFAC,,,

## 2019-08-22 PROCEDURE — 99499 NO LOS: ICD-10-PCS | Mod: S$GLB,,, | Performed by: INTERNAL MEDICINE

## 2019-08-22 NOTE — PROGRESS NOTES
Marshall Victor 59 y.o. male is here today for Blood Pressure check.   History of HTN YES    Review of patient's allergies indicates:   Allergen Reactions    Penicillins      As a child broke out     Creatinine   Date Value Ref Range Status   07/31/2019 1.0 0.5 - 1.4 mg/dL Final     Sodium   Date Value Ref Range Status   07/31/2019 135 (L) 136 - 145 mmol/L Final     Potassium   Date Value Ref Range Status   07/31/2019 4.2 3.5 - 5.1 mmol/L Final   ]  Patient IS taking blood pressure medications on a regular basis at the same time of the day.     Current Outpatient Medications:     amLODIPine (NORVASC) 5 MG tablet, Take 1 tablet (5 mg total) by mouth once daily., Disp: 90 tablet, Rfl: 1    fluocinolone (VANOS) 0.01 % cream, Apply topically 2 (two) times daily. Apply 1-2x daily for dermatitis flareup; max 10 days w flareup., Disp: 15 g, Rfl: 1    metoprolol succinate (TOPROL-XL) 50 MG 24 hr tablet, TAKE 1 TABLET(50 MG) BY MOUTH EVERY DAY IN THE MORNING, Disp: 90 tablet, Rfl: 0  Does patient have record of home blood pressure readings YES Readings have been averaging 120/ 70 WITH NO MEDICATION  Last dose of blood pressure medication was taken THIS AM  Patient isASYMPTOMATIC    Pt does not feel he needs this medication for BP, feels his bp is better without it. Also feels the metoprolol is effecting his running. He plans to stop both medications, he will monitor his bp and keep us informed.

## 2019-10-01 DIAGNOSIS — I10 UNCONTROLLED HYPERTENSION: ICD-10-CM

## 2019-10-03 RX ORDER — METOPROLOL SUCCINATE 50 MG/1
TABLET, EXTENDED RELEASE ORAL
Qty: 90 TABLET | Refills: 3 | Status: SHIPPED | OUTPATIENT
Start: 2019-10-03 | End: 2020-08-27

## 2020-03-01 DIAGNOSIS — I10 UNCONTROLLED HYPERTENSION: ICD-10-CM

## 2020-03-01 RX ORDER — AMLODIPINE BESYLATE 5 MG/1
TABLET ORAL
Qty: 90 TABLET | Refills: 0 | Status: SHIPPED | OUTPATIENT
Start: 2020-03-01 | End: 2020-05-31

## 2020-03-02 NOTE — TELEPHONE ENCOUNTER
Called patient and notified of refill. Patient was notified he was do for a follow up appt. Patient voiced understanding and stated he would call back to schedule after his current appt. I voiced understanding.

## 2020-05-01 DIAGNOSIS — Z12.11 COLON CANCER SCREENING: ICD-10-CM

## 2020-05-06 ENCOUNTER — PATIENT MESSAGE (OUTPATIENT)
Dept: ADMINISTRATIVE | Facility: HOSPITAL | Age: 60
End: 2020-05-06

## 2020-05-30 DIAGNOSIS — I10 UNCONTROLLED HYPERTENSION: ICD-10-CM

## 2020-05-31 RX ORDER — AMLODIPINE BESYLATE 5 MG/1
TABLET ORAL
Qty: 90 TABLET | Refills: 0 | Status: SHIPPED | OUTPATIENT
Start: 2020-05-31 | End: 2020-08-04

## 2020-06-01 NOTE — TELEPHONE ENCOUNTER
Called and spoke with pt. Pt was notified of rx refill and that he was due for a follow up appt. Pt is scheduled for labs and routine follow up. Pt voiced understanding.

## 2020-07-21 ENCOUNTER — PATIENT MESSAGE (OUTPATIENT)
Dept: ADMINISTRATIVE | Facility: HOSPITAL | Age: 60
End: 2020-07-21

## 2020-07-21 ENCOUNTER — PATIENT OUTREACH (OUTPATIENT)
Dept: ADMINISTRATIVE | Facility: HOSPITAL | Age: 60
End: 2020-07-21

## 2020-07-21 ENCOUNTER — TELEPHONE (OUTPATIENT)
Dept: FAMILY MEDICINE | Facility: CLINIC | Age: 60
End: 2020-07-21

## 2020-07-21 DIAGNOSIS — Z12.5 PROSTATE CANCER SCREENING: Primary | ICD-10-CM

## 2020-07-21 NOTE — PROGRESS NOTES
Health Maintenance Due   Topic Date Due    HIV Screening  1975    Shingles Vaccine (1 of 2) 2012    PROSTATE-SPECIFIC ANTIGEN  2020       Chart review completed 2020.  Care Everywhere updates requested and reviewed.  Immunizations reconciled. Media reports reviewed.  Duplicate HM overrides and  orders removed.  Overdue HM topic chart audit and/or requested.  Overdue lab testing linked to upcoming lab appointments if applies.

## 2020-07-22 NOTE — TELEPHONE ENCOUNTER
PSA linked to existing labs  Attempted to contact pt to discuss shingles vaccine. No answer, left message for pt to call clinic back.

## 2020-07-22 NOTE — TELEPHONE ENCOUNTER
Please notify patient that I have ordered a PSA screen for his next blood draw please add that to his scheduled labs.  I will be happy to write him a prescription for the Shingrix vaccine and we can talk about it at the next office visit.

## 2020-07-22 NOTE — TELEPHONE ENCOUNTER
Spoke with pt and notified of labs linked to existing order and info on shingles vaccine. Pt verbalized understanding.

## 2020-07-31 ENCOUNTER — LAB VISIT (OUTPATIENT)
Dept: LAB | Facility: HOSPITAL | Age: 60
End: 2020-07-31
Attending: INTERNAL MEDICINE
Payer: COMMERCIAL

## 2020-07-31 DIAGNOSIS — E87.1 HYPONATREMIA: ICD-10-CM

## 2020-07-31 DIAGNOSIS — Z12.5 PROSTATE CANCER SCREENING: ICD-10-CM

## 2020-07-31 DIAGNOSIS — I10 ESSENTIAL HYPERTENSION: ICD-10-CM

## 2020-07-31 DIAGNOSIS — I10 WHITE COAT SYNDROME WITH DIAGNOSIS OF HYPERTENSION: ICD-10-CM

## 2020-07-31 DIAGNOSIS — E78.49 OTHER HYPERLIPIDEMIA: ICD-10-CM

## 2020-07-31 LAB
ANION GAP SERPL CALC-SCNC: 7 MMOL/L (ref 8–16)
BUN SERPL-MCNC: 13 MG/DL (ref 6–20)
CALCIUM SERPL-MCNC: 9.1 MG/DL (ref 8.7–10.5)
CHLORIDE SERPL-SCNC: 100 MMOL/L (ref 95–110)
CHOLEST SERPL-MCNC: 159 MG/DL (ref 120–199)
CHOLEST/HDLC SERPL: 3.1 {RATIO} (ref 2–5)
CO2 SERPL-SCNC: 24 MMOL/L (ref 23–29)
COMPLEXED PSA SERPL-MCNC: 0.96 NG/ML (ref 0–4)
CREAT SERPL-MCNC: 1.1 MG/DL (ref 0.5–1.4)
EST. GFR  (AFRICAN AMERICAN): >60 ML/MIN/1.73 M^2
EST. GFR  (NON AFRICAN AMERICAN): >60 ML/MIN/1.73 M^2
GLUCOSE SERPL-MCNC: 94 MG/DL (ref 70–110)
HDLC SERPL-MCNC: 52 MG/DL (ref 40–75)
HDLC SERPL: 32.7 % (ref 20–50)
LDLC SERPL CALC-MCNC: 90.6 MG/DL (ref 63–159)
NONHDLC SERPL-MCNC: 107 MG/DL
POTASSIUM SERPL-SCNC: 4.4 MMOL/L (ref 3.5–5.1)
SODIUM SERPL-SCNC: 131 MMOL/L (ref 136–145)
TRIGL SERPL-MCNC: 82 MG/DL (ref 30–150)

## 2020-07-31 PROCEDURE — 80048 BASIC METABOLIC PNL TOTAL CA: CPT

## 2020-07-31 PROCEDURE — 80061 LIPID PANEL: CPT

## 2020-07-31 PROCEDURE — 84153 ASSAY OF PSA TOTAL: CPT

## 2020-07-31 PROCEDURE — 36415 COLL VENOUS BLD VENIPUNCTURE: CPT | Mod: PN

## 2020-08-04 ENCOUNTER — OFFICE VISIT (OUTPATIENT)
Dept: FAMILY MEDICINE | Facility: CLINIC | Age: 60
End: 2020-08-04
Payer: COMMERCIAL

## 2020-08-04 VITALS
SYSTOLIC BLOOD PRESSURE: 140 MMHG | TEMPERATURE: 98 F | OXYGEN SATURATION: 99 % | HEIGHT: 72 IN | WEIGHT: 195.56 LBS | BODY MASS INDEX: 26.49 KG/M2 | DIASTOLIC BLOOD PRESSURE: 76 MMHG | HEART RATE: 72 BPM

## 2020-08-04 DIAGNOSIS — Z01.89 ENCOUNTER FOR LABORATORY EXAMINATION: ICD-10-CM

## 2020-08-04 DIAGNOSIS — Z78.9 ALCOHOL USE: ICD-10-CM

## 2020-08-04 DIAGNOSIS — E87.1 HYPONATREMIA: ICD-10-CM

## 2020-08-04 DIAGNOSIS — I10 WHITE COAT SYNDROME WITH DIAGNOSIS OF HYPERTENSION: ICD-10-CM

## 2020-08-04 DIAGNOSIS — E66.3 OVERWEIGHT (BMI 25.0-29.9): ICD-10-CM

## 2020-08-04 DIAGNOSIS — E78.49 OTHER HYPERLIPIDEMIA: ICD-10-CM

## 2020-08-04 DIAGNOSIS — F41.9 ANXIETY: ICD-10-CM

## 2020-08-04 DIAGNOSIS — I10 ESSENTIAL HYPERTENSION: Primary | ICD-10-CM

## 2020-08-04 PROCEDURE — 99214 PR OFFICE/OUTPT VISIT, EST, LEVL IV, 30-39 MIN: ICD-10-PCS | Mod: S$GLB,,, | Performed by: INTERNAL MEDICINE

## 2020-08-04 PROCEDURE — 99999 PR PBB SHADOW E&M-EST. PATIENT-LVL III: CPT | Mod: PBBFAC,,, | Performed by: INTERNAL MEDICINE

## 2020-08-04 PROCEDURE — 99214 OFFICE O/P EST MOD 30 MIN: CPT | Mod: S$GLB,,, | Performed by: INTERNAL MEDICINE

## 2020-08-04 PROCEDURE — 99999 PR PBB SHADOW E&M-EST. PATIENT-LVL III: ICD-10-PCS | Mod: PBBFAC,,, | Performed by: INTERNAL MEDICINE

## 2020-08-04 RX ORDER — BUSPIRONE HYDROCHLORIDE 5 MG/1
TABLET ORAL
Qty: 30 TABLET | Refills: 1 | Status: SHIPPED | OUTPATIENT
Start: 2020-08-04 | End: 2023-12-18

## 2020-08-04 RX ORDER — ESCITALOPRAM OXALATE 5 MG/1
5 TABLET ORAL DAILY
Qty: 30 TABLET | Refills: 11 | Status: SHIPPED | OUTPATIENT
Start: 2020-08-04 | End: 2021-07-31

## 2020-08-04 RX ORDER — AMLODIPINE BESYLATE 10 MG/1
10 TABLET ORAL DAILY
Qty: 30 TABLET | Refills: 2 | Status: SHIPPED | OUTPATIENT
Start: 2020-08-04 | End: 2020-08-26 | Stop reason: SDUPTHER

## 2020-08-04 NOTE — PROGRESS NOTES
Subjective:       Patient ID: Marshall Victor is a 60 y.o. male.    Chief Complaint:  For reassessment and go over his labs as well as his uncontrolled hypertension  HPI  Here today for reassessment, and to go over his labs.      Essential hypertension:  Blood pressure at home has been averaging 140/76; here he is 172/98 and prior to that 172/90.  Blood pressure cuff is greater than 2 years old and have advised him to buy a new cuff and to return with the cuff in 2 weeks for nursing visit for blood pressure check.  To also sit for 4 min before taking his blood pressure readings with his legs flat on the ground.  Will increase amlodipine from 5-10 mg per day.     Generalized anxiety disorder:  Patient feels too intense.  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  He has occasionally overwhelmed.  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  He denies depression.  Willing to try low-dose Lexapro 5 mg a day and BuSpar 5 mg p.o. t.i.d. as needed for anxiety.  He sleeping good.  Hopefully control of his anxiety will help his blood pressure.     Hyponatremia:  Sodium down to 131.  Patient does drink a lot a water during the day.  He works out and 90 degree heat in the garage.  Drinks lot of water with his workouts and without side work.  He has cut back on his coffee intake but still acts as a diuretic.  Will check urine and serum osmolarity and urine spot sodium levels.  Concentrate of fluid intake to less than 2000 mL per day.  Concentrate his water intake to less than 800-1000 cc per day substitute the difference with sport drinks for electrolytes     Alcohol use:  Drinks 12 beers on weekends.  Have recommended that this is not helping his hyponatremia.  Requested that he dropped his alcohol intake at least 50% if not able to wean off.     Other hyperlipidemia:  On low-fat high-fiber diet or least tries; improved lipids with LDL gone from 129 to now 90.6 and total cholesterol from 205 to 159.   HDL is 52 with triglyceride 82.     Overweight:  BMI here today is 26.52; has gained 2 lb 5 oz over the last year.     Total time 11:00 a.m. through 11:35 a.m..  Greater than 50% of time spent in discussion, counseling, and review.  Labs reviewed and ordered for follow-up.  Medications also reviewed and addressed.      Review of Systems   Constitutional: Negative for appetite change and unexpected weight change.   HENT: Negative for congestion, postnasal drip, rhinorrhea and sinus pressure.         Denies seasonal allergies, or perennial allergies   Eyes: Negative for discharge and itching.   Respiratory: Negative for cough, chest tightness, shortness of breath and wheezing.    Cardiovascular: Negative for chest pain, palpitations and leg swelling.   Gastrointestinal: Negative for abdominal pain, blood in stool, constipation, diarrhea, nausea and vomiting.   Endocrine: Negative for polydipsia, polyphagia and polyuria.   Genitourinary: Negative for dysuria and hematuria.   Musculoskeletal: Negative for arthralgias and myalgias.   Skin: Negative for rash.   Allergic/Immunologic: Negative for environmental allergies and food allergies.   Neurological: Negative for tremors, seizures and headaches.   Hematological: Negative for adenopathy. Does not bruise/bleed easily.   Psychiatric/Behavioral: Negative for dysphoric mood and sleep disturbance. The patient is nervous/anxious.         Denies anxiety or depression.       Objective:      Vitals:    08/04/20 1040 08/04/20 1045 08/04/20 1102   BP: (!) 176/90 (!) 172/98 (!) 140/76  Comment: BP avr at home; needs new cuff and to sit 4 min before check   BP Location: Right arm Right arm    Patient Position: Sitting Sitting    BP Method: Large (Manual) Large (Manual)    Pulse: 72     Temp: 98.2 °F (36.8 °C)     TempSrc: Temporal     SpO2: 99%     Weight: 88.7 kg (195 lb 8.8 oz)     Height: 6' (1.829 m)       Body mass index is 26.52 kg/m².  Wt Readings from Last 3 Encounters:    08/04/20 88.7 kg (195 lb 8.8 oz)   08/09/19 87.6 kg (193 lb 3.7 oz)   05/07/19 88.3 kg (194 lb 8.9 oz)        Physical Exam  Vitals signs reviewed.   Constitutional:       Appearance: He is well-developed.   HENT:      Head: Normocephalic and atraumatic.   Neck:      Musculoskeletal: Normal range of motion and neck supple.      Thyroid: No thyromegaly.      Vascular: No carotid bruit.   Cardiovascular:      Rate and Rhythm: Normal rate and regular rhythm.      Heart sounds: Normal heart sounds. No murmur. No gallop.    Pulmonary:      Effort: Pulmonary effort is normal. No respiratory distress.      Breath sounds: Normal breath sounds. No wheezing or rales.   Abdominal:      General: Bowel sounds are normal. There is no distension.      Palpations: Abdomen is soft.      Tenderness: There is no abdominal tenderness. There is no guarding or rebound.   Musculoskeletal: Normal range of motion.   Lymphadenopathy:      Cervical: No cervical adenopathy.   Skin:     Findings: No rash.   Neurological:      Mental Status: He is alert and oriented to person, place, and time.      Comments: Moves all 4 extremities fine.   Psychiatric:         Behavior: Behavior normal.         Thought Content: Thought content normal.         Assessment:       1. Essential hypertension    2. Anxiety    3. Other hyperlipidemia    4. White coat syndrome with diagnosis of hypertension    5. Hyponatremia    6. Alcohol use    7. Overweight (BMI 25.0-29.9)    8. Encounter for laboratory examination        Plan:       Essential hypertension: Maintain < 2 Gm Na a day diet, and monitor BP at home; keep a log. Needs new BP cuff; sit for 4 minutes before taking BP. Cont metoprolol succinate 50 mg daily.  Increase amlodipine from 5 mg to 10 mg per day. Nursing visit in 2 weeks for BP check and bring his new cuff in as well.   -     amLODIPine (NORVASC) 10 MG tablet; Take 1 tablet (10 mg total) by mouth once daily.  Dispense: 30 tablet; Refill:  2    Anxiety: Limit caffeine intake with stress reduction and regular exercise as tolerated.  -     escitalopram oxalate (LEXAPRO) 5 MG Tab; Take 1 tablet (5 mg total) by mouth once daily.  Dispense: 30 tablet; Refill: 11  -     busPIRone (BUSPAR) 5 MG Tab; 5 mg po TID as needed for anxiety or elevated BP.  Dispense: 30 tablet; Refill: 1    Other hyperlipidemia: Maintain low fat high fiber diet, exercise regularly. Weight reduction where indicated.    White coat syndrome with diagnosis of hypertension: consider buspar before doctor visits.     Hyponatremia: < 2000 ml of fluid a day; decrease water to < 800-1000 ml a day; use sport drinks for difference.   -     SODIUM, URINE, RANDOM  -     Osmolality, Serum; Future; Expected date: 08/04/2020  -     OSMOLALITY, URINE RANDOM  -     Basic metabolic panel; Future; Expected date: 08/04/2020    Alcohol use:  Drinks 12 beers weekend.  Have recommended that he wean off alcohol if not possible then at least 50%.  This will help his hyponatremia and his cholesterol numbers as well as his weight    Overweight (BMI 25.0-29.9): Caloric restriction w regular exercise and weight reduction.    Encounter for lab examination:  Labs reviewed and explained to patient at length and ordered for follow-up

## 2020-08-04 NOTE — PATIENT INSTRUCTIONS
Essential hypertension: Maintain < 2 Gm Na a day diet, and monitor BP at home; keep a log. Needs new BP cuff; sit for 4 minutes before taking BP. Cont other BP meds. Cont other BP meds. Nursing visit in 2 weeks for BP check and bring his new cuff in as well.   -     amLODIPine (NORVASC) 10 MG tablet; Take 1 tablet (10 mg total) by mouth once daily.  Dispense: 30 tablet; Refill: 2    Anxiety: Limit caffeine intake with stress reduction and regular exercise as tolerated.  -     escitalopram oxalate (LEXAPRO) 5 MG Tab; Take 1 tablet (5 mg total) by mouth once daily.  Dispense: 30 tablet; Refill: 11  -     busPIRone (BUSPAR) 5 MG Tab; 5 mg po TID as needed for anxiety or elevated BP.  Dispense: 30 tablet; Refill: 1    Other hyperlipidemia: Maintain low fat high fiber diet, exercise regularly. Weight reduction where indicated.    White coat syndrome with diagnosis of hypertension: consider buspar before doctor visits.     Hyponatremia: < 2000 ml of fluid a day; decrease water to < 800-1000 ml a day; use sport drinks for difference.   -     SODIUM, URINE, RANDOM  -     Osmolality, Serum; Future; Expected date: 08/04/2020  -     OSMOLALITY, URINE RANDOM  -     Basic metabolic panel; Future; Expected date: 08/04/2020    Overweight (BMI 25.0-29.9): Caloric restriction w regular exercise and weight reduction.

## 2020-08-18 ENCOUNTER — CLINICAL SUPPORT (OUTPATIENT)
Dept: FAMILY MEDICINE | Facility: CLINIC | Age: 60
End: 2020-08-18
Payer: COMMERCIAL

## 2020-08-18 VITALS — SYSTOLIC BLOOD PRESSURE: 142 MMHG | DIASTOLIC BLOOD PRESSURE: 70 MMHG

## 2020-08-18 DIAGNOSIS — Z01.30 BP CHECK: Primary | ICD-10-CM

## 2020-08-18 PROCEDURE — 99999 PR PBB SHADOW E&M-EST. PATIENT-LVL I: CPT | Mod: PBBFAC,,,

## 2020-08-18 PROCEDURE — 99499 UNLISTED E&M SERVICE: CPT | Mod: S$GLB,,, | Performed by: INTERNAL MEDICINE

## 2020-08-18 PROCEDURE — 99999 PR PBB SHADOW E&M-EST. PATIENT-LVL I: ICD-10-PCS | Mod: PBBFAC,,,

## 2020-08-18 PROCEDURE — 99499 NO LOS: ICD-10-PCS | Mod: S$GLB,,, | Performed by: INTERNAL MEDICINE

## 2020-08-18 NOTE — PROGRESS NOTES
/78    Blood pressure reading after 15 minutes was 142/70.  Dr. Farley notified.    Marshall Victor 60 y.o. male is here today for Blood Pressure check.   History of HTN yes.    Review of patient's allergies indicates:   Allergen Reactions    Penicillins      As a child broke out     Creatinine   Date Value Ref Range Status   07/31/2020 1.1 0.5 - 1.4 mg/dL Final     Sodium   Date Value Ref Range Status   07/31/2020 131 (L) 136 - 145 mmol/L Final     Potassium   Date Value Ref Range Status   07/31/2020 4.4 3.5 - 5.1 mmol/L Final   ]  Patient verifies taking blood pressure medications on a regular basis at the same time of the day.     Current Outpatient Medications:     amLODIPine (NORVASC) 10 MG tablet, Take 1 tablet (10 mg total) by mouth once daily., Disp: 30 tablet, Rfl: 2    busPIRone (BUSPAR) 5 MG Tab, 5 mg po TID as needed for anxiety or elevated BP., Disp: 30 tablet, Rfl: 1    escitalopram oxalate (LEXAPRO) 5 MG Tab, Take 1 tablet (5 mg total) by mouth once daily., Disp: 30 tablet, Rfl: 11    fluocinolone (VANOS) 0.01 % cream, Apply topically 2 (two) times daily. Apply 1-2x daily for dermatitis flareup; max 10 days w flareup., Disp: 15 g, Rfl: 1    metoprolol succinate (TOPROL-XL) 50 MG 24 hr tablet, TAKE 1 TABLET(50 MG) BY MOUTH EVERY DAY IN THE MORNING, Disp: 90 tablet, Rfl: 3  Does patient have record of home blood pressure readings no.    Last dose of blood pressure medication was taken at today 8/18/20 @ 7:30am.  Patient is asymptomatic.   Complains of na.

## 2020-08-23 DIAGNOSIS — I10 UNCONTROLLED HYPERTENSION: ICD-10-CM

## 2020-08-24 DIAGNOSIS — I10 UNCONTROLLED HYPERTENSION: ICD-10-CM

## 2020-08-26 DIAGNOSIS — I10 UNCONTROLLED HYPERTENSION: ICD-10-CM

## 2020-08-26 DIAGNOSIS — I10 ESSENTIAL HYPERTENSION: ICD-10-CM

## 2020-08-26 NOTE — TELEPHONE ENCOUNTER
----- Message from Ramesh Kapadia sent at 8/25/2020  4:26 PM CDT -----  Type:  Pharmacy Calling to Clarify an RX    Name of Caller:  Erum  Pharmacy Name:    [id number] - [place] [address] AT [address] [facility] & [address] [address]  Phone: [phone] Fax: [phone]  Prescription Name:    1. amLODIPine (NORVASC) 10 MG tablet  2. metoprolol succinate (TOPROL-XL) 50 MG 24 hr tablet  What do they need to clarify?:  Refill authorization  Best Call Back Number:  [phone]  Additional Information:  NA

## 2020-08-27 RX ORDER — METOPROLOL SUCCINATE 50 MG/1
TABLET, EXTENDED RELEASE ORAL
Qty: 90 TABLET | Refills: 3 | Status: SHIPPED | OUTPATIENT
Start: 2020-08-27 | End: 2020-08-29

## 2020-08-28 ENCOUNTER — TELEPHONE (OUTPATIENT)
Dept: FAMILY MEDICINE | Facility: CLINIC | Age: 60
End: 2020-08-28

## 2020-08-28 NOTE — TELEPHONE ENCOUNTER
----- Message from Adriane Ybarra sent at 8/28/2020 12:32 PM CDT -----  Regarding: pharmacy auth  Contact: [facility]  Type:  Pharmacy Calling to Clarify an RX    Name of Caller:  Ira  Pharmacy Name:  [facility]  Prescription Name:  amLODIPine (NORVASC) 10 MG tablet  What do they need to clarify?:  refill request  Best Call Back Number:  [phone]  Additional Information:    States no response to 2nd refill request

## 2020-08-29 RX ORDER — AMLODIPINE BESYLATE 5 MG/1
TABLET ORAL
Qty: 90 TABLET | Refills: 1 | Status: SHIPPED | OUTPATIENT
Start: 2020-08-29 | End: 2020-08-29

## 2020-08-29 RX ORDER — AMLODIPINE BESYLATE 10 MG/1
10 TABLET ORAL DAILY
Qty: 90 TABLET | Refills: 1 | Status: SHIPPED | OUTPATIENT
Start: 2020-08-29 | End: 2021-05-31

## 2020-08-29 RX ORDER — METOPROLOL SUCCINATE 50 MG/1
TABLET, EXTENDED RELEASE ORAL
Qty: 90 TABLET | Refills: 1 | Status: SHIPPED | OUTPATIENT
Start: 2020-08-29 | End: 2022-05-19 | Stop reason: SDUPTHER

## 2020-09-21 ENCOUNTER — PATIENT OUTREACH (OUTPATIENT)
Dept: ADMINISTRATIVE | Facility: HOSPITAL | Age: 60
End: 2020-09-21

## 2020-09-21 NOTE — PROGRESS NOTES
Health Maintenance Due   Topic Date Due    HIV Screening  1975    Shingles Vaccine (1 of 2) 2012    Influenza Vaccine (1) 2020       Chart review completed 2020.  Care Everywhere updates requested and reviewed.  Immunizations reconciled. Media reports reviewed.  Duplicate HM overrides and  orders removed.  Overdue HM topic chart audit and/or requested.  Overdue lab testing linked to upcoming lab appointments if applies.

## 2020-09-27 ENCOUNTER — PATIENT MESSAGE (OUTPATIENT)
Dept: FAMILY MEDICINE | Facility: CLINIC | Age: 60
End: 2020-09-27

## 2020-10-30 ENCOUNTER — PATIENT MESSAGE (OUTPATIENT)
Dept: ADMINISTRATIVE | Facility: HOSPITAL | Age: 60
End: 2020-10-30

## 2021-01-04 ENCOUNTER — PATIENT MESSAGE (OUTPATIENT)
Dept: ADMINISTRATIVE | Facility: HOSPITAL | Age: 61
End: 2021-01-04

## 2021-03-06 ENCOUNTER — PATIENT MESSAGE (OUTPATIENT)
Dept: ADMINISTRATIVE | Facility: HOSPITAL | Age: 61
End: 2021-03-06

## 2021-04-06 ENCOUNTER — PATIENT MESSAGE (OUTPATIENT)
Dept: ADMINISTRATIVE | Facility: HOSPITAL | Age: 61
End: 2021-04-06

## 2021-05-13 DIAGNOSIS — Z12.11 COLON CANCER SCREENING: ICD-10-CM

## 2021-05-30 DIAGNOSIS — I10 ESSENTIAL HYPERTENSION: ICD-10-CM

## 2021-05-31 RX ORDER — AMLODIPINE BESYLATE 10 MG/1
TABLET ORAL
Qty: 30 TABLET | Refills: 0 | Status: SHIPPED | OUTPATIENT
Start: 2021-05-31 | End: 2021-06-04

## 2021-06-01 DIAGNOSIS — I10 ESSENTIAL HYPERTENSION: ICD-10-CM

## 2021-06-04 RX ORDER — AMLODIPINE BESYLATE 10 MG/1
TABLET ORAL
Qty: 30 TABLET | Refills: 0 | Status: SHIPPED | OUTPATIENT
Start: 2021-06-04 | End: 2022-05-19 | Stop reason: SDUPTHER

## 2021-06-08 ENCOUNTER — PATIENT MESSAGE (OUTPATIENT)
Dept: FAMILY MEDICINE | Facility: CLINIC | Age: 61
End: 2021-06-08

## 2021-07-07 ENCOUNTER — PATIENT MESSAGE (OUTPATIENT)
Dept: ADMINISTRATIVE | Facility: HOSPITAL | Age: 61
End: 2021-07-07

## 2021-07-29 DIAGNOSIS — F41.9 ANXIETY: ICD-10-CM

## 2021-07-31 RX ORDER — ESCITALOPRAM OXALATE 5 MG/1
TABLET ORAL
Qty: 30 TABLET | Refills: 0 | Status: SHIPPED | OUTPATIENT
Start: 2021-07-31 | End: 2021-08-28

## 2021-08-28 DIAGNOSIS — F41.9 ANXIETY: ICD-10-CM

## 2021-08-28 RX ORDER — ESCITALOPRAM OXALATE 5 MG/1
TABLET ORAL
Qty: 30 TABLET | Refills: 0 | Status: SHIPPED | OUTPATIENT
Start: 2021-08-28 | End: 2021-10-30

## 2021-11-26 ENCOUNTER — PATIENT MESSAGE (OUTPATIENT)
Dept: FAMILY MEDICINE | Facility: CLINIC | Age: 61
End: 2021-11-26
Payer: COMMERCIAL

## 2021-12-15 ENCOUNTER — PATIENT OUTREACH (OUTPATIENT)
Dept: ADMINISTRATIVE | Facility: HOSPITAL | Age: 61
End: 2021-12-15
Payer: COMMERCIAL

## 2021-12-15 ENCOUNTER — PATIENT MESSAGE (OUTPATIENT)
Dept: ADMINISTRATIVE | Facility: HOSPITAL | Age: 61
End: 2021-12-15
Payer: COMMERCIAL

## 2022-02-11 ENCOUNTER — PATIENT OUTREACH (OUTPATIENT)
Dept: ADMINISTRATIVE | Facility: HOSPITAL | Age: 62
End: 2022-02-11
Payer: COMMERCIAL

## 2022-02-11 ENCOUNTER — PATIENT MESSAGE (OUTPATIENT)
Dept: ADMINISTRATIVE | Facility: HOSPITAL | Age: 62
End: 2022-02-11
Payer: COMMERCIAL

## 2022-02-28 ENCOUNTER — PATIENT MESSAGE (OUTPATIENT)
Dept: ADMINISTRATIVE | Facility: HOSPITAL | Age: 62
End: 2022-02-28
Payer: COMMERCIAL

## 2022-03-11 ENCOUNTER — PATIENT MESSAGE (OUTPATIENT)
Dept: ADMINISTRATIVE | Facility: HOSPITAL | Age: 62
End: 2022-03-11
Payer: COMMERCIAL

## 2022-05-19 ENCOUNTER — PATIENT MESSAGE (OUTPATIENT)
Dept: FAMILY MEDICINE | Facility: CLINIC | Age: 62
End: 2022-05-19
Payer: COMMERCIAL

## 2022-05-19 ENCOUNTER — TELEPHONE (OUTPATIENT)
Dept: FAMILY MEDICINE | Facility: CLINIC | Age: 62
End: 2022-05-19
Payer: COMMERCIAL

## 2022-05-19 ENCOUNTER — OFFICE VISIT (OUTPATIENT)
Dept: FAMILY MEDICINE | Facility: CLINIC | Age: 62
End: 2022-05-19
Payer: COMMERCIAL

## 2022-05-19 DIAGNOSIS — F41.9 ANXIETY: ICD-10-CM

## 2022-05-19 DIAGNOSIS — I10 UNCONTROLLED HYPERTENSION: ICD-10-CM

## 2022-05-19 DIAGNOSIS — S29.9XXA RIB INJURY: Primary | ICD-10-CM

## 2022-05-19 PROCEDURE — 99213 PR OFFICE/OUTPT VISIT, EST, LEVL III, 20-29 MIN: ICD-10-PCS | Mod: 95,,, | Performed by: NURSE PRACTITIONER

## 2022-05-19 PROCEDURE — 99213 OFFICE O/P EST LOW 20 MIN: CPT | Mod: 95,,, | Performed by: NURSE PRACTITIONER

## 2022-05-19 RX ORDER — ACETAMINOPHEN AND CODEINE PHOSPHATE 300; 60 MG/1; MG/1
TABLET ORAL
Qty: 40 TABLET | Refills: 0 | Status: SHIPPED | OUTPATIENT
Start: 2022-05-19 | End: 2022-09-16

## 2022-05-19 RX ORDER — ESCITALOPRAM OXALATE 5 MG/1
TABLET ORAL
Qty: 30 TABLET | Refills: 11 | Status: SHIPPED | OUTPATIENT
Start: 2022-05-19 | End: 2023-12-18

## 2022-05-19 RX ORDER — AMLODIPINE BESYLATE 10 MG/1
10 TABLET ORAL DAILY
Qty: 90 TABLET | Refills: 3 | Status: SHIPPED | OUTPATIENT
Start: 2022-05-19 | End: 2023-05-22

## 2022-05-19 RX ORDER — METOPROLOL SUCCINATE 50 MG/1
TABLET, EXTENDED RELEASE ORAL
Qty: 90 TABLET | Refills: 1 | Status: SHIPPED | OUTPATIENT
Start: 2022-05-19 | End: 2022-11-22

## 2022-05-19 NOTE — TELEPHONE ENCOUNTER
Spoke with pt, he fell down a few stairs carrying an 80 lb garbage can  He requested VV  Appt scheduled

## 2022-05-19 NOTE — TELEPHONE ENCOUNTER
----- Message from Yuli Paz sent at 5/19/2022  7:44 AM CDT -----  Type:  Same Day Appointment Request    Caller is requesting a same day appointment.  Caller declined first available appointment listed below.      Name of Caller:  patient   When is the first available appointment?  6/23  Symptoms:  fall, severe rib pain, also needs medication refills  Best Call Back Number:  [phone]   Additional Information: per patient requesting a VV-please advise-thank you

## 2022-05-19 NOTE — PROGRESS NOTES
"Marshall Victor is a 61 y.o. male patient of Dr. Jose Farley MD who presents to the clinic today for a Virtual Visit.    HPI    Patient, who is not known to me, reports a new problem to me: that he fell down the stairs while carrying a heavy load.    He hit the posterolat ribs and was seen at [facility]RGENT [facility]ARE-no fracture.  No problems breathing  Tramadol alternating with tylenol is not helping at all.    Answers for HPI/ROS submitted by the patient on 5/19/2022  Chronicity: chronic  Onset: more than 1 year ago  Progression since onset: waxing and waning  Condition status: resistant  anxiety: No  blurred vision: No  chest pain: No  headaches: No  malaise/fatigue: No  neck pain: No  orthopnea: No  palpitations: No  peripheral edema: No  PND: No  shortness of breath: No  sweats: No  CAD risks: smoking/tobacco exposure, stress  Compliance problems: exercise  Past treatments: ACE inhibitors, beta blockers  Improvement on treatment: moderate    Additionally, he is out of his BP meds and his BP at the URGENT CARE was "ameena high"    These symptoms began a few days and status is continuing..     Pt denies the following symptoms:  Fever, feeling bad r/t elevated BP    Aggravating factors include breathing, coughing, sneezing; lack of BP meds elevated the BP .    Relieving factors include none .    OTC Medications tried are Tylenol.    Prescription medications taken for symptoms are Tramadol.    Pertinent medical history:  H/o HTN, anxiety--both improved with medicaiton.    ROS    Constitutional:  No fever, no fatigue.    All other ROS neg.      Past Medical History:   Diagnosis Date    Overweight (BMI 25.0-29.9)     Squamous cell carcinoma, face 2011    removed 5 yrs ago.       Current Outpatient Medications:     amLODIPine (NORVASC) 10 MG tablet, TAKE 1 TABLET(10 MG) BY MOUTH EVERY DAY, Disp: 30 tablet, Rfl: 0    busPIRone (BUSPAR) 5 MG Tab, 5 mg po TID as needed for anxiety or elevated BP., Disp: 30 tablet, Rfl: 1    " EScitalopram oxalate (LEXAPRO) 5 MG Tab, TAKE 1 TABLET(5 MG) BY MOUTH ONCE DAILY, Disp: 30 tablet, Rfl: 0    fluocinolone (VANOS) 0.01 % cream, Apply topically 2 (two) times daily. Apply 1-2x daily for dermatitis flareup; max 10 days w flareup., Disp: 15 g, Rfl: 1    metoprolol succinate (TOPROL-XL) 50 MG 24 hr tablet, TAKE 1 TABLET(50 MG) BY MOUTH EVERY DAY IN THE MORNING, Disp: 90 tablet, Rfl: 1    Patient is current a smoker.    PHYSICAL EXAM  Marshall Victor is a 61 y.o. male patient of Jose Farley MD who presents to the clinic today for a Virtual Visit.    The patient location is: [place], [place]  The chief complaint leading to consultation is: rib pain and out of BP meds  Visit type: audiovisual  Total time spent with patient: 20 minutes.  Each patient to whom he or she provides medical services by telemedicine is:  (1) informed of the relationship between the physician and patient and the respective role of any other health care provider with respect to management of the patient; and (2) notified that he or she may decline to receive medical services by telemedicine and may withdraw from such care at any time.    23 minutes of total time spent on the encounter, which includes face to face time and non-face to face time preparing to see the patient (eg, review of tests), Obtaining and/or reviewing separately obtained history, Documenting clinical information in the electronic or other health record, Independently interpreting results (not separately reported) and communicating results to the patient/family/caregiver, or Care coordination (not separately reported).     HPI    Pt, who is not known to me, reports a new problem to me: fell and hurt his left posterolat ribs .    URGENT CARE visit showed no fx.   Tramadol not helping the rib pain    These symptoms began a few days  ago and status is continung.     Symptoms are + acute.    Pt denies the following symptoms:  Shortness of breath    Aggravating  factors include coughing, sneezing .    Relieving factors include nothing .    OTC Medications tried are Tylenol.    Prescription medications taken for symptoms are alternating with tramadol.    Pertinent medical history:  Smoker.  HTN-out of meds.  Anxiety, improved with meds..    Smoking status:  Current smoker    ROS    Constitutional:   No  fever.    All other ROS neg.    MS:  See HPI    Skin:  No rashes, itching.    Past Medical History:   Diagnosis Date    Overweight (BMI 25.0-29.9)     Squamous cell carcinoma, face 2011    removed 5 yrs ago.       Current Outpatient Medications:     acetaminophen-codeine 300-60mg (TYLENOL #4) 300-60 mg Tab, 1-2 tablets every 6 hrs as needed for pain.  Do not exceed 6 doses in a day., Disp: 40 tablet, Rfl: 0    amLODIPine (NORVASC) 10 MG tablet, Take 1 tablet (10 mg total) by mouth once daily., Disp: 90 tablet, Rfl: 3    busPIRone (BUSPAR) 5 MG Tab, 5 mg po TID as needed for anxiety or elevated BP., Disp: 30 tablet, Rfl: 1    EScitalopram oxalate (LEXAPRO) 5 MG Tab, TAKE 1 TABLET(5 MG) BY MOUTH ONCE DAILY, Disp: 30 tablet, Rfl: 11    fluocinolone (VANOS) 0.01 % cream, Apply topically 2 (two) times daily. Apply 1-2x daily for dermatitis flareup; max 10 days w flareup., Disp: 15 g, Rfl: 1    metoprolol succinate (TOPROL-XL) 50 MG 24 hr tablet, TAKE 1 TABLET(50 MG) BY MOUTH EVERY DAY IN THE MORNING, Disp: 90 tablet, Rfl: 1      PHYSICAL EXAM    There were no vitals filed for this visit.  Vital signs not taken per patient.  Patient's questionnaire (if available), medications & PMH all reviewed today.    Gen:  Alert, coop 61 y.o. male patient in no acute distress, is not ill-appearing.           Well developed, well-nourished  Psych:   Behavior and affect appropriate for the situation and setting.  HENT:   Normocephalic, atraumatic.  Symmetrical facial movements.    No sneezing during the visit.  Voice steady, no hoarseness noted.  Resp:   Respiratory effort  symmetrical.  No retractions  No increased work of breathing  No audible wheezes  Talks in full sentences.  No coughing during the interaction today.  CV:   No sam oral cyanosis  MSK:  Head and upper extremity movements smooth and even.  Neuro:  Responds promptly to questions showing good insight.  Skin:   No observed rashes/bruises    Rib injury  Comments:  acute  Orders:  -     acetaminophen-codeine 300-60mg (TYLENOL #4) 300-60 mg Tab; 1-2 tablets every 6 hrs as needed for pain.  Do not exceed 6 doses in a day.  Dispense: 40 tablet; Refill: 0    Uncontrolled hypertension  -     amLODIPine (NORVASC) 10 MG tablet; Take 1 tablet (10 mg total) by mouth once daily.  Dispense: 90 tablet; Refill: 3  -     metoprolol succinate (TOPROL-XL) 50 MG 24 hr tablet; TAKE 1 TABLET(50 MG) BY MOUTH EVERY DAY IN THE MORNING  Dispense: 90 tablet; Refill: 1    Anxiety  Comments:  controlled with lexapro  Orders:  -     EScitalopram oxalate (LEXAPRO) 5 MG Tab; TAKE 1 TABLET(5 MG) BY MOUTH ONCE DAILY  Dispense: 30 tablet; Refill: 11            Pt today presents with report of rib pain from a fall.  Went to [facility]RGENT CARE-no fx.Tramadol not helping at all with pain.  Additionally, he has been out of his BP medication and his BP was very high in the URGENT CARE yesterday.  Requesting RF of lexapro, which he says has changed his life.  He feels so much better.      This is a new problem to me.  No work up is planned.   Pt advised to perform comfort measures recommended on patient instruction sheet, which were reviewed at the time of the visit..    If not better in 14 days, the patient is advised to call here, PCP office or go for an in-person/follow up evaluation.  If worse or concerns, the patient is advised to call for advise to this office or the PCP office or call [facility] or go to the nearest URGENT CARE or ER.  Explained exam findings, diagnosis and treatment plan to patient.  Questions answered and patient states  understanding.

## 2022-05-19 NOTE — PATIENT INSTRUCTIONS
Stop the tramadol.    Switch to Tylenol #3 1-2 tablets every 6-8 hrs as needed for pain--not > 6 tablets daily.    Alternate with ibuprofen.    Splint the sore area when coughing or sneezing.    If fever, illness or productive cough develops, recheck.    If you are not some better in 2 weeks or are worse at any time, go for an in-person evaluation to your PCP office, an URGENT CARE or the Emergency Department.    You have been evaluated today via a telehealth visit.  While providing good convenience and increased access to care, it is not always possible to fully evaluate a medical problem through type of patient care encounter.  If your symptoms change or worsen or the treatment is not effective, please follow-up with an in-person visit to your primary care center, at an urgent care or the emergency room.  Thank you for choosing [facility]!    JOSUE Swann, CNP, FNP-BC  [facility]

## 2022-05-19 NOTE — Clinical Note
Good afternoon! Mr. Victor wants you to know that your were absolutely correct about the lexapro.  The 1 year he's been on it is the best year of his life.  He feels great!  He saw me today because he fell and injured his posterolat ribs while carrying a heavy load on the stairs.  He was seen at URGENT CARE and xrays show now fracture.  They prescribed tramadol, but it's not helping.  I'm recommendind Tylenol #3 every 6 hrs alternating with ibuprofen. He'll let you know if this is helping or not, as I cannot prescribe any other medications for this.  And I'll be out of the office for the next week. Thank you! Adelaide Bowens, JOSUE, CNP, FNP-BC [facility]

## 2022-07-27 ENCOUNTER — TELEPHONE (OUTPATIENT)
Dept: FAMILY MEDICINE | Facility: CLINIC | Age: 62
End: 2022-07-27
Payer: COMMERCIAL

## 2022-07-27 DIAGNOSIS — Z12.11 COLON CANCER SCREENING: ICD-10-CM

## 2022-07-27 NOTE — TELEPHONE ENCOUNTER
----- Message from Daljit Kaplan sent at 7/27/2022  2:17 PM CDT -----  Regarding: new rx  Contact: Patient  Patient want to know if office can call in some muscle relaxer in for back pain, please send to [facility], any questions please call back at [phone] (home)     [facility] #[id number] - [place] [address] AT [address] [address]  Phone: [phone] Fax: [phone]    Case number [id number]

## 2022-08-01 ENCOUNTER — PATIENT MESSAGE (OUTPATIENT)
Dept: ADMINISTRATIVE | Facility: HOSPITAL | Age: 62
End: 2022-08-01
Payer: COMMERCIAL

## 2022-09-13 ENCOUNTER — TELEPHONE (OUTPATIENT)
Dept: FAMILY MEDICINE | Facility: CLINIC | Age: 62
End: 2022-09-13
Payer: COMMERCIAL

## 2022-09-13 DIAGNOSIS — E78.49 OTHER HYPERLIPIDEMIA: ICD-10-CM

## 2022-09-13 DIAGNOSIS — I10 ESSENTIAL HYPERTENSION: Primary | ICD-10-CM

## 2022-09-13 DIAGNOSIS — F41.9 ANXIETY: ICD-10-CM

## 2022-09-13 DIAGNOSIS — E66.3 OVERWEIGHT (BMI 25.0-29.9): ICD-10-CM

## 2022-09-13 DIAGNOSIS — Z12.5 PROSTATE CANCER SCREENING: ICD-10-CM

## 2022-09-13 NOTE — TELEPHONE ENCOUNTER
----- Message from Sushma Reza sent at 9/13/2022 10:00 AM CDT -----  Contact: Pt  Type:  Lab/Urine orders    Name of Caller:  Pt    Symptoms:  UTI    Best Call Back Number:  [phone]    Additional Information:  Pt has appt on Friday for UTI & wants to come in today to have urinalysis & blood work.  Please call back.  Thanks.

## 2022-09-14 NOTE — TELEPHONE ENCOUNTER
Please call patient and tell him that his labs have been sent in to [facility] for him to obtain after an overnight fast of at least 8 hours before his follow-up appointment.  Please have these scheduled

## 2022-09-15 ENCOUNTER — LAB VISIT (OUTPATIENT)
Dept: LAB | Facility: HOSPITAL | Age: 62
End: 2022-09-15
Attending: INTERNAL MEDICINE
Payer: COMMERCIAL

## 2022-09-15 DIAGNOSIS — I10 ESSENTIAL HYPERTENSION: ICD-10-CM

## 2022-09-15 DIAGNOSIS — F41.9 ANXIETY: ICD-10-CM

## 2022-09-15 DIAGNOSIS — Z12.5 PROSTATE CANCER SCREENING: ICD-10-CM

## 2022-09-15 DIAGNOSIS — E78.49 OTHER HYPERLIPIDEMIA: ICD-10-CM

## 2022-09-15 DIAGNOSIS — E66.3 OVERWEIGHT (BMI 25.0-29.9): ICD-10-CM

## 2022-09-15 LAB
ALBUMIN SERPL BCP-MCNC: 3.8 G/DL (ref 3.5–5.2)
ALP SERPL-CCNC: 95 U/L (ref 55–135)
ALT SERPL W/O P-5'-P-CCNC: 49 U/L (ref 10–44)
ANION GAP SERPL CALC-SCNC: 10 MMOL/L (ref 8–16)
AST SERPL-CCNC: 26 U/L (ref 10–40)
BASOPHILS # BLD AUTO: 0.14 K/UL (ref 0–0.2)
BASOPHILS NFR BLD: 1.4 % (ref 0–1.9)
BILIRUB SERPL-MCNC: 0.3 MG/DL (ref 0.1–1)
BILIRUB UR QL STRIP: NEGATIVE
BUN SERPL-MCNC: 11 MG/DL (ref 6–20)
CALCIUM SERPL-MCNC: 9.8 MG/DL (ref 8.7–10.5)
CHLORIDE SERPL-SCNC: 94 MMOL/L (ref 95–110)
CHOLEST SERPL-MCNC: 174 MG/DL (ref 120–199)
CHOLEST/HDLC SERPL: 3.9 {RATIO} (ref 2–5)
CLARITY UR REFRACT.AUTO: CLEAR
CO2 SERPL-SCNC: 24 MMOL/L (ref 23–29)
COLOR UR AUTO: YELLOW
CREAT SERPL-MCNC: 0.8 MG/DL (ref 0.5–1.4)
DIFFERENTIAL METHOD: ABNORMAL
EOSINOPHIL # BLD AUTO: 0.2 K/UL (ref 0–0.5)
EOSINOPHIL NFR BLD: 1.5 % (ref 0–8)
ERYTHROCYTE [DISTWIDTH] IN BLOOD BY AUTOMATED COUNT: 12.5 % (ref 11.5–14.5)
EST. GFR  (NO RACE VARIABLE): >60 ML/MIN/1.73 M^2
ESTIMATED AVG GLUCOSE: 100 MG/DL (ref 68–131)
GLUCOSE SERPL-MCNC: 86 MG/DL (ref 70–110)
GLUCOSE UR QL STRIP: NEGATIVE
HBA1C MFR BLD: 5.1 % (ref 4–5.6)
HCT VFR BLD AUTO: 42.9 % (ref 40–54)
HDLC SERPL-MCNC: 45 MG/DL (ref 40–75)
HDLC SERPL: 25.9 % (ref 20–50)
HGB BLD-MCNC: 15 G/DL (ref 14–18)
HGB UR QL STRIP: NEGATIVE
HYALINE CASTS UR QL AUTO: 3 /LPF
IMM GRANULOCYTES # BLD AUTO: 0.08 K/UL (ref 0–0.04)
IMM GRANULOCYTES NFR BLD AUTO: 0.8 % (ref 0–0.5)
KETONES UR QL STRIP: NEGATIVE
LDLC SERPL CALC-MCNC: 107 MG/DL (ref 63–159)
LEUKOCYTE ESTERASE UR QL STRIP: ABNORMAL
LYMPHOCYTES # BLD AUTO: 2.5 K/UL (ref 1–4.8)
LYMPHOCYTES NFR BLD: 24.6 % (ref 18–48)
MCH RBC QN AUTO: 32.1 PG (ref 27–31)
MCHC RBC AUTO-ENTMCNC: 35 G/DL (ref 32–36)
MCV RBC AUTO: 92 FL (ref 82–98)
MICROSCOPIC COMMENT: ABNORMAL
MONOCYTES # BLD AUTO: 1.1 K/UL (ref 0.3–1)
MONOCYTES NFR BLD: 10.7 % (ref 4–15)
NEUTROPHILS # BLD AUTO: 6.3 K/UL (ref 1.8–7.7)
NEUTROPHILS NFR BLD: 61 % (ref 38–73)
NITRITE UR QL STRIP: NEGATIVE
NONHDLC SERPL-MCNC: 129 MG/DL
NRBC BLD-RTO: 0 /100 WBC
PH UR STRIP: 6 [PH] (ref 5–8)
PLATELET # BLD AUTO: 461 K/UL (ref 150–450)
PMV BLD AUTO: 9.5 FL (ref 9.2–12.9)
POTASSIUM SERPL-SCNC: 4.7 MMOL/L (ref 3.5–5.1)
PROT SERPL-MCNC: 7.3 G/DL (ref 6–8.4)
PROT UR QL STRIP: ABNORMAL
RBC # BLD AUTO: 4.68 M/UL (ref 4.6–6.2)
RBC #/AREA URNS AUTO: 2 /HPF (ref 0–4)
SODIUM SERPL-SCNC: 128 MMOL/L (ref 136–145)
SP GR UR STRIP: >1.03 (ref 1–1.03)
SQUAMOUS #/AREA URNS AUTO: 0 /HPF
TRIGL SERPL-MCNC: 110 MG/DL (ref 30–150)
URN SPEC COLLECT METH UR: ABNORMAL
WBC # BLD AUTO: 10.31 K/UL (ref 3.9–12.7)
WBC #/AREA URNS AUTO: 12 /HPF (ref 0–5)

## 2022-09-15 PROCEDURE — 81001 URINALYSIS AUTO W/SCOPE: CPT | Performed by: INTERNAL MEDICINE

## 2022-09-15 PROCEDURE — 80061 LIPID PANEL: CPT | Performed by: INTERNAL MEDICINE

## 2022-09-15 PROCEDURE — 80053 COMPREHEN METABOLIC PANEL: CPT | Performed by: INTERNAL MEDICINE

## 2022-09-15 PROCEDURE — 84153 ASSAY OF PSA TOTAL: CPT | Performed by: INTERNAL MEDICINE

## 2022-09-15 PROCEDURE — 36415 COLL VENOUS BLD VENIPUNCTURE: CPT | Mod: PN | Performed by: INTERNAL MEDICINE

## 2022-09-15 PROCEDURE — 84443 ASSAY THYROID STIM HORMONE: CPT | Performed by: INTERNAL MEDICINE

## 2022-09-15 PROCEDURE — 85025 COMPLETE CBC W/AUTO DIFF WBC: CPT | Performed by: INTERNAL MEDICINE

## 2022-09-15 PROCEDURE — 83036 HEMOGLOBIN GLYCOSYLATED A1C: CPT | Performed by: INTERNAL MEDICINE

## 2022-09-15 PROCEDURE — 87086 URINE CULTURE/COLONY COUNT: CPT | Performed by: INTERNAL MEDICINE

## 2022-09-16 ENCOUNTER — OFFICE VISIT (OUTPATIENT)
Dept: FAMILY MEDICINE | Facility: CLINIC | Age: 62
End: 2022-09-16
Payer: COMMERCIAL

## 2022-09-16 VITALS
HEART RATE: 72 BPM | SYSTOLIC BLOOD PRESSURE: 132 MMHG | BODY MASS INDEX: 25.73 KG/M2 | HEIGHT: 72 IN | WEIGHT: 189.94 LBS | DIASTOLIC BLOOD PRESSURE: 78 MMHG

## 2022-09-16 DIAGNOSIS — N30.90 CYSTITIS WITHOUT HEMATURIA: Primary | ICD-10-CM

## 2022-09-16 DIAGNOSIS — E78.49 OTHER HYPERLIPIDEMIA: ICD-10-CM

## 2022-09-16 DIAGNOSIS — Z80.42 FAMILY HISTORY OF PROSTATE CANCER IN FATHER: ICD-10-CM

## 2022-09-16 DIAGNOSIS — Z01.89 ENCOUNTER FOR LABORATORY TEST: ICD-10-CM

## 2022-09-16 DIAGNOSIS — I10 ESSENTIAL HYPERTENSION: ICD-10-CM

## 2022-09-16 DIAGNOSIS — F41.9 ANXIETY: ICD-10-CM

## 2022-09-16 DIAGNOSIS — E87.1 HYPONATREMIA: ICD-10-CM

## 2022-09-16 DIAGNOSIS — E66.3 OVERWEIGHT (BMI 25.0-29.9): ICD-10-CM

## 2022-09-16 DIAGNOSIS — R97.20 INCREASED PROSTATE SPECIFIC ANTIGEN (PSA) VELOCITY: ICD-10-CM

## 2022-09-16 DIAGNOSIS — K59.00 CONSTIPATION, UNSPECIFIED CONSTIPATION TYPE: ICD-10-CM

## 2022-09-16 LAB
COMPLEXED PSA SERPL-MCNC: 2.4 NG/ML (ref 0–4)
TSH SERPL DL<=0.005 MIU/L-ACNC: 1.45 UIU/ML (ref 0.4–4)

## 2022-09-16 PROCEDURE — 1159F PR MEDICATION LIST DOCUMENTED IN MEDICAL RECORD: ICD-10-PCS | Mod: CPTII,S$GLB,, | Performed by: INTERNAL MEDICINE

## 2022-09-16 PROCEDURE — 3078F PR MOST RECENT DIASTOLIC BLOOD PRESSURE < 80 MM HG: ICD-10-PCS | Mod: CPTII,S$GLB,, | Performed by: INTERNAL MEDICINE

## 2022-09-16 PROCEDURE — 3044F HG A1C LEVEL LT 7.0%: CPT | Mod: CPTII,S$GLB,, | Performed by: INTERNAL MEDICINE

## 2022-09-16 PROCEDURE — 1160F PR REVIEW ALL MEDS BY PRESCRIBER/CLIN PHARMACIST DOCUMENTED: ICD-10-PCS | Mod: CPTII,S$GLB,, | Performed by: INTERNAL MEDICINE

## 2022-09-16 PROCEDURE — 3075F SYST BP GE 130 - 139MM HG: CPT | Mod: CPTII,S$GLB,, | Performed by: INTERNAL MEDICINE

## 2022-09-16 PROCEDURE — 3075F PR MOST RECENT SYSTOLIC BLOOD PRESS GE 130-139MM HG: ICD-10-PCS | Mod: CPTII,S$GLB,, | Performed by: INTERNAL MEDICINE

## 2022-09-16 PROCEDURE — 99999 PR PBB SHADOW E&M-EST. PATIENT-LVL IV: CPT | Mod: PBBFAC,,, | Performed by: INTERNAL MEDICINE

## 2022-09-16 PROCEDURE — 1159F MED LIST DOCD IN RCRD: CPT | Mod: CPTII,S$GLB,, | Performed by: INTERNAL MEDICINE

## 2022-09-16 PROCEDURE — 3078F DIAST BP <80 MM HG: CPT | Mod: CPTII,S$GLB,, | Performed by: INTERNAL MEDICINE

## 2022-09-16 PROCEDURE — 3008F BODY MASS INDEX DOCD: CPT | Mod: CPTII,S$GLB,, | Performed by: INTERNAL MEDICINE

## 2022-09-16 PROCEDURE — 3008F PR BODY MASS INDEX (BMI) DOCUMENTED: ICD-10-PCS | Mod: CPTII,S$GLB,, | Performed by: INTERNAL MEDICINE

## 2022-09-16 PROCEDURE — 99999 PR PBB SHADOW E&M-EST. PATIENT-LVL IV: ICD-10-PCS | Mod: PBBFAC,,, | Performed by: INTERNAL MEDICINE

## 2022-09-16 PROCEDURE — 1160F RVW MEDS BY RX/DR IN RCRD: CPT | Mod: CPTII,S$GLB,, | Performed by: INTERNAL MEDICINE

## 2022-09-16 PROCEDURE — 99214 PR OFFICE/OUTPT VISIT, EST, LEVL IV, 30-39 MIN: ICD-10-PCS | Mod: S$GLB,,, | Performed by: INTERNAL MEDICINE

## 2022-09-16 PROCEDURE — 99214 OFFICE O/P EST MOD 30 MIN: CPT | Mod: S$GLB,,, | Performed by: INTERNAL MEDICINE

## 2022-09-16 PROCEDURE — 3044F PR MOST RECENT HEMOGLOBIN A1C LEVEL <7.0%: ICD-10-PCS | Mod: CPTII,S$GLB,, | Performed by: INTERNAL MEDICINE

## 2022-09-16 RX ORDER — CIPROFLOXACIN 500 MG/1
TABLET ORAL
Qty: 8 TABLET | Refills: 0 | Status: SHIPPED | OUTPATIENT
Start: 2022-09-16 | End: 2023-01-17

## 2022-09-16 NOTE — PROGRESS NOTES
Subjective:       Patient ID: Marshall Victor is a 62 y.o. male.    Chief Complaint: Urinary Tract Infection    Urinary Tract Infection   Associated symptoms include frequency. Pertinent negatives include no chills, hematuria, nausea, vomiting, constipation or rash. Cystitis without hematuria; push fluids but as Gatorade/2. Limit water intake. Call in 48 hrs for urine cult results. Cipro for additional 7 days; may be needing prostate coverage as well.  Urology consult to assess for further evaluation inclusive of CAROL to evaluate for prostate enlargement; no problem urinating but notes his lower slow worse at night also to evaluate patient further for elevated increased PSA velocity which can be so suspicious for prostate cancer  -     ciprofloxacin HCl (CIPRO) 500 MG tablet; 500 mg po BID for 4 more days. Has 3 days of therapy at home he is to take. Generic  Dispense: 8 tablet; Refill: 0    Increase with PSA velocity:  Patient with a PSA 2020 of 0.96 and follow-up 09/15/2022 of 2.4 or an increase of average 0.72 per year in a PSA that is less than 2.5 urology consulted for further evaluation rule out prostate cancer as the above accelerated PSA velocity can be suspicious for prostate cancer    Family history of prostate cancer involving patient's father who  of prostate cancer    Constipation.  Usually has regular BMs Metamucil helps and good room leave with lower abdominal pressure recommend he take Metamucil 1-2 times per day and Colace 1 to 2 times a day as needed and keep well hydrated    Essential hypertension; .Maintain < 2 Gm Na a day diet, and monitor BP at home; keep a log.     Other hyperlipidemia; Maintain low fat high fiber diet, exercise regularly. Weight reduction where indicated.      Anxiety: Limit caffeine intake with stress reduction and regular exercise as tolerated.     Hyponatremia: limit free water intake; use flavored water or Gatorade/2.     Overweight (BMI 25.0-29.9); Caloric  restriction w regular exercise and weight reduction.     Encounter for laboratory test: labs reviewed w pt and ordered for f/u.          Review of Systems   Constitutional:  Negative for appetite change, chills, fever and unexpected weight change.   HENT:  Negative for congestion, postnasal drip, rhinorrhea and sinus pressure.         Denies seasonal allergies, or perennial allergies   Eyes:  Negative for discharge and itching.   Respiratory:  Negative for cough, chest tightness, shortness of breath and wheezing.    Cardiovascular:  Negative for chest pain, palpitations and leg swelling.   Gastrointestinal:  Negative for abdominal pain, blood in stool, constipation, diarrhea, nausea and vomiting.   Endocrine: Negative for polydipsia, polyphagia and polyuria.   Genitourinary:  Positive for frequency. Negative for dysuria and hematuria.   Musculoskeletal:  Negative for arthralgias and myalgias.   Skin:  Negative for rash.   Allergic/Immunologic: Negative for environmental allergies and food allergies.   Neurological:  Negative for tremors, seizures and headaches.   Hematological:  Negative for adenopathy. Does not bruise/bleed easily.   Psychiatric/Behavioral:          Denies anxiety or depression.    Objective:        Vitals:    09/16/22 1303   BP: 132/78   BP Location: Right arm   Pulse: 72   Weight: 86.1 kg (189 lb 14.8 oz)   Height: 6' (1.829 m)       BMI Readings from Last 3 Encounters:   09/16/22 25.76 kg/m²   08/04/20 26.52 kg/m²   08/09/19 26.21 kg/m²        Wt Readings from Last 3 Encounters:   09/16/22 1303 86.1 kg (189 lb 14.8 oz)   08/04/20 1040 88.7 kg (195 lb 8.8 oz)   08/09/19 0824 87.6 kg (193 lb 3.7 oz)        BP Readings from Last 3 Encounters:   09/16/22 132/78   08/18/20 (!) 142/70   08/04/20 (!) 140/76        There are no preventive care reminders to display for this patient.     Health Maintenance Due   Topic Date Due    COVID-19 Vaccine (1) Never done    HIV Screening  Never done    LDCT Lung  Screen  Never done    Pneumococcal Vaccines (Age 0-64) (2 - PCV) 2020    Colorectal Cancer Screening  2020    Influenza Vaccine (1) 2022    Shingles Vaccine (2 of 2) 2022         Past Medical History:   Diagnosis Date    Overweight (BMI 25.0-29.9)     Squamous cell carcinoma, face 2011    removed 5 yrs ago.       No past surgical history on file.    Social History     Tobacco Use    Smoking status: Some Days     Packs/day: 1.00     Years: 30.00     Pack years: 30.00     Types: Cigarettes    Smokeless tobacco: Never    Tobacco comments:     down to 1 pack a week for cigarettes   Substance Use Topics    Alcohol use: Yes     Alcohol/week: 5.0 standard drinks     Types: 5 Cans of beer per week     Comment: socially    Drug use: No       Family History   Problem Relation Age of Onset    Arthritis Mother     Cancer Father          of bladder cancer at 59. also had prostate cancer    Cancer Maternal Grandfather          lung cancer at 77; smoked.       Review of patient's allergies indicates:   Allergen Reactions    Penicillins      As a child broke out       Current Outpatient Medications on File Prior to Visit   Medication Sig Dispense Refill    amLODIPine (NORVASC) 10 MG tablet Take 1 tablet (10 mg total) by mouth once daily. 90 tablet 3    EScitalopram oxalate (LEXAPRO) 5 MG Tab TAKE 1 TABLET(5 MG) BY MOUTH ONCE DAILY 30 tablet 11    metoprolol succinate (TOPROL-XL) 50 MG 24 hr tablet TAKE 1 TABLET(50 MG) BY MOUTH EVERY DAY IN THE MORNING 90 tablet 1    busPIRone (BUSPAR) 5 MG Tab 5 mg po TID as needed for anxiety or elevated BP. (Patient not taking: Reported on 2022) 30 tablet 1    fluocinolone (VANOS) 0.01 % cream Apply topically 2 (two) times daily. Apply 1-2x daily for dermatitis flareup; max 10 days w flareup. (Patient not taking: Reported on 2022) 15 g 1     No current facility-administered medications on file prior to visit.       Physical Exam  Vitals reviewed.    Constitutional:       Appearance: He is well-developed.   HENT:      Head: Normocephalic and atraumatic.   Neck:      Thyroid: No thyromegaly.   Cardiovascular:      Rate and Rhythm: Normal rate and regular rhythm.      Heart sounds: Normal heart sounds. No murmur heard.    No gallop.   Pulmonary:      Effort: Pulmonary effort is normal. No respiratory distress.      Breath sounds: Normal breath sounds. No wheezing or rales.   Abdominal:      General: Bowel sounds are normal. There is no distension.      Palpations: Abdomen is soft.      Tenderness: There is no abdominal tenderness. There is no guarding or rebound.      Comments: BS+ nontender nonpalp L/S. No posterior CVA tenderness to palp. Soft nontender.    Musculoskeletal:         General: Normal range of motion.      Cervical back: Normal range of motion and neck supple.      Right lower leg: No edema.      Left lower leg: No edema.   Lymphadenopathy:      Cervical: No cervical adenopathy.   Skin:     Findings: No rash.   Neurological:      Mental Status: He is alert and oriented to person, place, and time.      Comments: Moves all 4 extremities fine.   Psychiatric:         Behavior: Behavior normal.         Thought Content: Thought content normal.       Assessment:       1. Cystitis without hematuria    2. Increased prostate specific antigen (PSA) velocity    3. Family history of prostate cancer in father    4. Constipation, unspecified constipation type    5. Essential hypertension    6. Other hyperlipidemia    7. Anxiety    8. Hyponatremia    9. Overweight (BMI 25.0-29.9)    10. Encounter for laboratory test          Plan:         Cystitis without hematuria; push fluids but as Gatorade/2. Limit water intake. Call in 48 hrs for urine cult results. Cipro for additional 7 days; may be needing prostate coverage as well.  Urology consult for further evaluation for possible BPH with urinary obstruction versus prostatitis. CAROL needed as well; notes able to  urinate okay but the flow was slow which is worse at night.  Rule out prostatitis versus BPH with urinary obstruction.  Please also see below.  Also with accelerated PSA velocity which needs further evaluation to rule out possible prostate cancer  -     ciprofloxacin HCl (CIPRO) 500 MG tablet; 500 mg po BID for 4 more days. Has 3 days of therapy at home he is to take. Generic  Dispense: 8 tablet; Refill: 0    Increase with PSA velocity:  Patient with a PSA 2020 of 0.96 and follow-up 09/15/2022 of 2.4 or an increase of average 0.72 per year in a PSA that is less than 2.5; can be suspicious for prostate cancer.   consulted as above for further evaluation Dr. Bashir    Family history of prostate cancer involving patient's father who  of prostate cancer    Essential hypertension; .Maintain < 2 Gm Na a day diet, and monitor BP at home; keep a log.  Wait for 5 minutes after being seated in the morning with feet flat on the ground before pressing the blood pressure cuff to run    Other hyperlipidemia; Maintain low fat high fiber diet, exercise regularly. Weight reduction where indicated.      Anxiety: Limit caffeine intake with stress reduction and regular exercise as tolerated.  TSH 1.45    Hyponatremia: limit free water intake; use flavored water or Gatorade/2.     Overweight (BMI 25.0-29.9); Caloric restriction w regular exercise and weight reduction.     Encounter for laboratory test: labs reviewed w pt and ordered for f/u.

## 2022-09-16 NOTE — PATIENT INSTRUCTIONS
Cystitis without hematuria; push fluids but as Gatorade/2. Limit water intake. Call in 48 hrs for urine cult results.   -     ciprofloxacin HCl (CIPRO) 500 MG tablet; 500 mg po BID for 4 more days. Has 3 days of therapy at home he is to take. Generic  Dispense: 8 tablet; Refill: 0    Essential hypertension; .Maintain < 2 Gm Na a day diet, and monitor BP at home; keep a log.     Other hyperlipidemia; Maintain low fat high fiber diet, exercise regularly. Weight reduction where indicated.      Anxiety: Limit caffeine intake with stress reduction and regular exercise as tolerated.     Hyponatremia: limit free water intake; use flavored water or gatorade/2.     Overweight (BMI 25.0-29.9); Caloric restriction w regular exercise and weight reduction.     Encounter for laboratory test: labs reviewed w pt and ordered for f/u.

## 2022-09-17 LAB — BACTERIA UR CULT: NO GROWTH

## 2022-11-03 ENCOUNTER — PATIENT MESSAGE (OUTPATIENT)
Dept: ADMINISTRATIVE | Facility: HOSPITAL | Age: 62
End: 2022-11-03
Payer: COMMERCIAL

## 2022-11-30 ENCOUNTER — OFFICE VISIT (OUTPATIENT)
Dept: UROLOGY | Facility: CLINIC | Age: 62
End: 2022-11-30
Payer: COMMERCIAL

## 2022-11-30 VITALS — HEIGHT: 72 IN | WEIGHT: 194 LBS | BODY MASS INDEX: 26.28 KG/M2

## 2022-11-30 DIAGNOSIS — N40.0 BPH WITHOUT URINARY OBSTRUCTION: ICD-10-CM

## 2022-11-30 DIAGNOSIS — R97.20 INCREASED PROSTATE SPECIFIC ANTIGEN (PSA) VELOCITY: ICD-10-CM

## 2022-11-30 DIAGNOSIS — Z80.42 FAMILY HISTORY OF PROSTATE CANCER: Primary | ICD-10-CM

## 2022-11-30 PROCEDURE — 99999 PR PBB SHADOW E&M-EST. PATIENT-LVL III: ICD-10-PCS | Mod: PBBFAC,,, | Performed by: UROLOGY

## 2022-11-30 PROCEDURE — 3008F PR BODY MASS INDEX (BMI) DOCUMENTED: ICD-10-PCS | Mod: CPTII,S$GLB,, | Performed by: UROLOGY

## 2022-11-30 PROCEDURE — 3044F HG A1C LEVEL LT 7.0%: CPT | Mod: CPTII,S$GLB,, | Performed by: UROLOGY

## 2022-11-30 PROCEDURE — 99204 PR OFFICE/OUTPT VISIT, NEW, LEVL IV, 45-59 MIN: ICD-10-PCS | Mod: S$GLB,,, | Performed by: UROLOGY

## 2022-11-30 PROCEDURE — 3044F PR MOST RECENT HEMOGLOBIN A1C LEVEL <7.0%: ICD-10-PCS | Mod: CPTII,S$GLB,, | Performed by: UROLOGY

## 2022-11-30 PROCEDURE — 99204 OFFICE O/P NEW MOD 45 MIN: CPT | Mod: S$GLB,,, | Performed by: UROLOGY

## 2022-11-30 PROCEDURE — 3008F BODY MASS INDEX DOCD: CPT | Mod: CPTII,S$GLB,, | Performed by: UROLOGY

## 2022-11-30 PROCEDURE — 99999 PR PBB SHADOW E&M-EST. PATIENT-LVL III: CPT | Mod: PBBFAC,,, | Performed by: UROLOGY

## 2022-11-30 PROCEDURE — 1159F PR MEDICATION LIST DOCUMENTED IN MEDICAL RECORD: ICD-10-PCS | Mod: CPTII,S$GLB,, | Performed by: UROLOGY

## 2022-11-30 PROCEDURE — 1159F MED LIST DOCD IN RCRD: CPT | Mod: CPTII,S$GLB,, | Performed by: UROLOGY

## 2023-01-03 ENCOUNTER — PATIENT OUTREACH (OUTPATIENT)
Dept: ADMINISTRATIVE | Facility: HOSPITAL | Age: 63
End: 2023-01-03
Payer: COMMERCIAL

## 2023-01-03 ENCOUNTER — PATIENT MESSAGE (OUTPATIENT)
Dept: ADMINISTRATIVE | Facility: HOSPITAL | Age: 63
End: 2023-01-03
Payer: COMMERCIAL

## 2023-01-03 NOTE — PROGRESS NOTES
2023 Care Everywhere updates requested and reviewed.  Immunizations reconciled. Media reports reviewed.  Duplicate HM overrides and  orders removed.  Overdue HM topic chart audit and/or requested.  Overdue lab testing linked to upcoming lab appointments if applies.  Lab yosi, and Eloquii reviewed   Lab testing     Health Maintenance Due   Topic Date Due    HIV Screening  Never done    LDCT Lung Screen  Never done    Pneumococcal Vaccines (Age 0-64) (2 - PCV) 2020    Colorectal Cancer Screening  2020    COVID-19 Vaccine (5 - Booster for Moderna series) 2022

## 2023-01-03 NOTE — LETTER
January 10, 2023    Marshall Victor  [address]  [place] [address]             [facility]  [address]  [place] [address]  Phone: [phone] Dear Joseph [facility] is committed to your overall health.  To help you get the most out of each of your visits, we will review your information to make sure you are up to date on all of your recommended tests and/or procedures.       Jose Farley MD  has found that your chart shows you may be due for :         Health Maintenance Due   Topic    HIV Screening     LDCT Lung Screen     Pneumococcal Vaccines     Colorectal Cancer Screening     COVID-19 Vaccine (5 - Booster for Moderna series)         If you have had any of the above done at another facility, please bring the records or information with you so that your record at [facility] will be complete.  If you would like to schedule any of these test, please call [phone] or send a message via [email or web address].        If you are currently taking medication, please bring it with you to your appointment for review.           Thank you for letting us care for you,        Jose Farley MD and your [facility] Team

## 2023-01-06 ENCOUNTER — PATIENT MESSAGE (OUTPATIENT)
Dept: FAMILY MEDICINE | Facility: CLINIC | Age: 63
End: 2023-01-06
Payer: COMMERCIAL

## 2023-01-12 ENCOUNTER — LAB VISIT (OUTPATIENT)
Dept: LAB | Facility: HOSPITAL | Age: 63
End: 2023-01-12
Attending: INTERNAL MEDICINE
Payer: COMMERCIAL

## 2023-01-12 DIAGNOSIS — E66.3 OVERWEIGHT (BMI 25.0-29.9): ICD-10-CM

## 2023-01-12 DIAGNOSIS — E78.49 OTHER HYPERLIPIDEMIA: ICD-10-CM

## 2023-01-12 LAB
CHOLEST SERPL-MCNC: 186 MG/DL (ref 120–199)
CHOLEST/HDLC SERPL: 4 {RATIO} (ref 2–5)
HDLC SERPL-MCNC: 47 MG/DL (ref 40–75)
HDLC SERPL: 25.3 % (ref 20–50)
LDLC SERPL CALC-MCNC: 122.4 MG/DL (ref 63–159)
NONHDLC SERPL-MCNC: 139 MG/DL
TRIGL SERPL-MCNC: 83 MG/DL (ref 30–150)

## 2023-01-12 PROCEDURE — 36415 COLL VENOUS BLD VENIPUNCTURE: CPT | Mod: PN | Performed by: INTERNAL MEDICINE

## 2023-01-12 PROCEDURE — 80061 LIPID PANEL: CPT | Performed by: INTERNAL MEDICINE

## 2023-01-17 ENCOUNTER — OFFICE VISIT (OUTPATIENT)
Dept: FAMILY MEDICINE | Facility: CLINIC | Age: 63
End: 2023-01-17
Payer: COMMERCIAL

## 2023-01-17 VITALS
HEART RATE: 74 BPM | SYSTOLIC BLOOD PRESSURE: 128 MMHG | WEIGHT: 195.13 LBS | DIASTOLIC BLOOD PRESSURE: 72 MMHG | BODY MASS INDEX: 27.32 KG/M2 | HEIGHT: 71 IN

## 2023-01-17 DIAGNOSIS — Z12.5 PROSTATE CANCER SCREENING: ICD-10-CM

## 2023-01-17 DIAGNOSIS — E66.3 OVERWEIGHT (BMI 25.0-29.9): ICD-10-CM

## 2023-01-17 DIAGNOSIS — Z87.891 FORMER SMOKER, STOPPED SMOKING IN DISTANT PAST: ICD-10-CM

## 2023-01-17 DIAGNOSIS — E87.1 HYPONATREMIA: ICD-10-CM

## 2023-01-17 DIAGNOSIS — Z01.89 ENCOUNTER FOR LABORATORY TEST: ICD-10-CM

## 2023-01-17 DIAGNOSIS — E78.49 OTHER HYPERLIPIDEMIA: Primary | ICD-10-CM

## 2023-01-17 DIAGNOSIS — Z78.9 ALCOHOL USE: ICD-10-CM

## 2023-01-17 DIAGNOSIS — I10 ESSENTIAL HYPERTENSION: ICD-10-CM

## 2023-01-17 DIAGNOSIS — F41.9 ANXIETY: ICD-10-CM

## 2023-01-17 PROCEDURE — 1159F MED LIST DOCD IN RCRD: CPT | Mod: CPTII,S$GLB,, | Performed by: INTERNAL MEDICINE

## 2023-01-17 PROCEDURE — 3078F PR MOST RECENT DIASTOLIC BLOOD PRESSURE < 80 MM HG: ICD-10-PCS | Mod: CPTII,S$GLB,, | Performed by: INTERNAL MEDICINE

## 2023-01-17 PROCEDURE — 3078F DIAST BP <80 MM HG: CPT | Mod: CPTII,S$GLB,, | Performed by: INTERNAL MEDICINE

## 2023-01-17 PROCEDURE — 1160F PR REVIEW ALL MEDS BY PRESCRIBER/CLIN PHARMACIST DOCUMENTED: ICD-10-PCS | Mod: CPTII,S$GLB,, | Performed by: INTERNAL MEDICINE

## 2023-01-17 PROCEDURE — 99999 PR PBB SHADOW E&M-EST. PATIENT-LVL III: CPT | Mod: PBBFAC,,, | Performed by: INTERNAL MEDICINE

## 2023-01-17 PROCEDURE — 3074F PR MOST RECENT SYSTOLIC BLOOD PRESSURE < 130 MM HG: ICD-10-PCS | Mod: CPTII,S$GLB,, | Performed by: INTERNAL MEDICINE

## 2023-01-17 PROCEDURE — 1159F PR MEDICATION LIST DOCUMENTED IN MEDICAL RECORD: ICD-10-PCS | Mod: CPTII,S$GLB,, | Performed by: INTERNAL MEDICINE

## 2023-01-17 PROCEDURE — 99999 PR PBB SHADOW E&M-EST. PATIENT-LVL III: ICD-10-PCS | Mod: PBBFAC,,, | Performed by: INTERNAL MEDICINE

## 2023-01-17 PROCEDURE — 3008F PR BODY MASS INDEX (BMI) DOCUMENTED: ICD-10-PCS | Mod: CPTII,S$GLB,, | Performed by: INTERNAL MEDICINE

## 2023-01-17 PROCEDURE — 3074F SYST BP LT 130 MM HG: CPT | Mod: CPTII,S$GLB,, | Performed by: INTERNAL MEDICINE

## 2023-01-17 PROCEDURE — 3008F BODY MASS INDEX DOCD: CPT | Mod: CPTII,S$GLB,, | Performed by: INTERNAL MEDICINE

## 2023-01-17 PROCEDURE — 99214 OFFICE O/P EST MOD 30 MIN: CPT | Mod: S$GLB,,, | Performed by: INTERNAL MEDICINE

## 2023-01-17 PROCEDURE — 99214 PR OFFICE/OUTPT VISIT, EST, LEVL IV, 30-39 MIN: ICD-10-PCS | Mod: S$GLB,,, | Performed by: INTERNAL MEDICINE

## 2023-01-17 PROCEDURE — 1160F RVW MEDS BY RX/DR IN RCRD: CPT | Mod: CPTII,S$GLB,, | Performed by: INTERNAL MEDICINE

## 2023-01-17 NOTE — PROGRESS NOTES
Subjective:       Patient ID: Marshall Victor is a 62 y.o. male.    Chief Complaint: Follow-up (Lipid done.)  HPI here today for reassessment as well as review of his labs  Follow-up  Pertinent negatives include no abdominal pain, arthralgias, chest pain, congestion, coughing, headaches, myalgias, nausea, rash or vomiting.   Other hyperlipidemia: .Maintain low fat high fiber diet, exercise regularly. Weight reduction where indicated. Cont metamucil daily. Watch diet and exercise more.  Lipid profile:  Cholesterol 186/triglyceride 83/HDL 47/.  Lipid profile for follow-up.  Intermediate 10 year ASCVD risk score at 15.5%  -     Lipid Panel; Future; Expected date: 2023    Essential hypertension; Maintain < 2 Gm Na a day diet, and monitor BP at home; keep a log.  Cont amlodipine and metoprolol.  Blood pressure at home has been averaging 125 to 130/80 manually here today he is 128/72 with pulse 74.  Try and exercise more with goal 5 times a week minimum 30 minutes.    Anxiety:  Lexapro 5 mg 1 per day helps; has BuSpar for anxiety as needed.  Regular exercise and stress reduction and limitation of caffeine intake will all help.    Alcohol use:  Quit with the pandemic with COVID; try to continue to avoid any alcohol intake.    Hyponatremia: was drinking more then; has quit alcohol. Less free water.  9/15 sodium 128; has quit drinking since then; knows to restrict free water intake, [facility] for follow-up; will follow with him being on escitalopram as well.  TSH was normal  -     Comprehensive Metabolic Panel; Future; Expected date: 2023    Overweight (BMI 25.0-29.9); Caloric restriction w regular exercise and weight reduction.     Former smoker, stopped smoking in distant past; occasionally resumes use losenges to help him stay off.  Two pack per day for 20 years 40 pack year smoking history.  Wants to wait on low-dose CT of the chest for lung cancer screening    Prostate cancer screenin/15/2022 PSA  2.4 previously on 2020 was 0.96.  Or an average increase over 12 months of 0.66.  Follow-up PSA as per urology service.  Dad reportedly  of prostate cancer.  CAROL was fine.  Followed by urology Dr. Rios.     Encounter for laboratory test; reviewed w pt and ordered ofr f/u.   -     Lipid Panel; Future; Expected date: 2023  -     Comprehensive Metabolic Panel; Future; Expected date: 2023        The 10-year ASCVD risk score (Chevy EGAN, et al., 2019) is: 15.5%    Values used to calculate the score:      Age: 62 years      Sex: Male      Is Non- : No      Diabetic: No      Tobacco smoker: Yes      Systolic Blood Pressure: 128 mmHg      Is BP treated: Yes      HDL Cholesterol: 47 mg/dL      Total Cholesterol: 186 mg/dL  [facility] neg for heart ds. Past smoker at 2 PPD for 20 yrs;       Review of Systems   Constitutional:  Negative for appetite change and unexpected weight change.   HENT:  Negative for congestion, postnasal drip, rhinorrhea and sinus pressure.         Denies seasonal allergies, or perennial allergies   Eyes:  Negative for discharge and itching.   Respiratory:  Negative for cough, chest tightness, shortness of breath and wheezing.    Cardiovascular:  Negative for chest pain, palpitations and leg swelling.   Gastrointestinal:  Negative for abdominal pain, blood in stool, constipation, diarrhea, nausea and vomiting.   Endocrine: Negative for polydipsia, polyphagia and polyuria.   Genitourinary:  Negative for dysuria and hematuria.   Musculoskeletal:  Negative for arthralgias and myalgias.   Skin:  Negative for rash.   Allergic/Immunologic: Negative for environmental allergies and food allergies.   Neurological:  Negative for tremors, seizures and headaches.   Hematological:  Negative for adenopathy. Does not bruise/bleed easily.   Psychiatric/Behavioral:  Negative for dysphoric mood. The patient is not nervous/anxious.         Denies anxiety or depression.   "    Objective:        Vitals:    23 1415   BP: 128/72   BP Location: Left arm   Pulse: 74   Weight: 88.5 kg (195 lb 2.4 oz)   Height: 5' 11" (1.803 m)       BMI Readings from Last 3 Encounters:   23 27.22 kg/m²   22 26.31 kg/m²   22 25.76 kg/m²        Wt Readings from Last 3 Encounters:   23 1415 88.5 kg (195 lb 2.4 oz)   22 1048 88 kg (194 lb 0.1 oz)   22 1303 86.1 kg (189 lb 14.8 oz)        BP Readings from Last 3 Encounters:   23 128/72   22 132/78   20 (!) 142/70        There are no preventive care reminders to display for this patient.     Health Maintenance Due   Topic Date Due    HIV Screening  Never done    LDCT Lung Screen  Never done    Pneumococcal Vaccines (Age 0-64) (2 - PCV) 2020    Colorectal Cancer Screening  2020    COVID-19 Vaccine (5 - Booster for Moderna series) 2022         Past Medical History:   Diagnosis Date    Overweight (BMI 25.0-29.9)     Squamous cell carcinoma, face 2011    removed 5 yrs ago.       No past surgical history on file.    Social History     Tobacco Use    Smoking status: Some Days     Packs/day: 1.00     Years: 30.00     Pack years: 30.00     Types: Cigarettes    Smokeless tobacco: Never    Tobacco comments:     down to 1 pack a week for cigarettes   Substance Use Topics    Alcohol use: Yes     Alcohol/week: 5.0 standard drinks     Types: 5 Cans of beer per week     Comment: socially    Drug use: No       Family History   Problem Relation Age of Onset    Arthritis Mother     Cancer Father          of bladder cancer at 59. also had prostate cancer    Cancer Maternal Grandfather          lung cancer at 77; smoked.       Review of patient's allergies indicates:   Allergen Reactions    Penicillins      As a child broke out       Current Outpatient Medications on File Prior to Visit   Medication Sig Dispense Refill    amLODIPine (NORVASC) 10 MG tablet Take 1 tablet (10 mg total) by mouth once " daily. 90 tablet 3    metoprolol succinate (TOPROL-XL) 50 MG 24 hr tablet TAKE 1 TABLET(50 MG) BY MOUTH EVERY DAY IN THE MORNING 90 tablet 1    busPIRone (BUSPAR) 5 MG Tab 5 mg po TID as needed for anxiety or elevated BP. (Patient not taking: Reported on 1/17/2023) 30 tablet 1    EScitalopram oxalate (LEXAPRO) 5 MG Tab TAKE 1 TABLET(5 MG) BY MOUTH ONCE DAILY (Patient not taking: Reported on 1/17/2023) 30 tablet 11    fluocinolone (VANOS) 0.01 % cream Apply topically 2 (two) times daily. Apply 1-2x daily for dermatitis flareup; max 10 days w flareup. (Patient not taking: Reported on 1/17/2023) 15 g 1     No current facility-administered medications on file prior to visit.       Physical Exam  Vitals reviewed.   Constitutional:       Appearance: He is well-developed.   HENT:      Head: Normocephalic and atraumatic.   Neck:      Thyroid: No thyromegaly.      Vascular: No carotid bruit.   Cardiovascular:      Rate and Rhythm: Normal rate and regular rhythm.      Heart sounds: Normal heart sounds. No murmur heard.    No gallop.   Pulmonary:      Effort: Pulmonary effort is normal. No respiratory distress.      Breath sounds: Normal breath sounds. No wheezing or rales.   Abdominal:      General: Bowel sounds are normal. There is no distension.      Palpations: Abdomen is soft.      Tenderness: There is no abdominal tenderness. There is no guarding or rebound.   Musculoskeletal:         General: Normal range of motion.      Cervical back: Normal range of motion and neck supple.      Right lower leg: No edema.      Left lower leg: No edema.   Lymphadenopathy:      Cervical: No cervical adenopathy.   Skin:     Findings: No rash.   Neurological:      Mental Status: He is alert and oriented to person, place, and time.      Comments: Moves all 4 extremities fine.   Psychiatric:         Behavior: Behavior normal.         Thought Content: Thought content normal.       Assessment:       1. Other hyperlipidemia    2. Essential  hypertension    3. Anxiety    4. Alcohol use    5. Hyponatremia    6. Overweight (BMI 25.0-29.9)    7. Former smoker, stopped smoking in distant past    8. Prostate cancer screening    9. Encounter for laboratory test        Plan:       Other hyperlipidemia: .Maintain low fat high fiber diet, exercise regularly. Weight reduction where indicated. Cont metamucil daily. Watch diet and exercise more.  Lipid profile:  Cholesterol 186/triglyceride 83/HDL 47/.  Lipid profile for follow-up.  -     Lipid Panel; Future; Expected date: 2023    Essential hypertension; Maintain < 2 Gm Na a day diet, and monitor BP at home; keep a log.  Cont amlodipine and metoprolol.  Blood pressure at home has been averaging 125 to 130/80 manually here today he is 128/72 with pulse 74.  Try and exercise more with goal 5 times a week minimum 30 minutes.    Anxiety:  Lexapro 5 mg 1 per day helps; has BuSpar for anxiety as needed.  Regular exercise and stress reduction and limitation of caffeine intake will all help.    Alcohol use:  Quit with the pandemic with COVID; try to continue to avoid any alcohol intake.    Hyponatremia: was drinking more then; has quit alcohol. Less free water.  9/15 sodium 128; has quit drinking since then; knows to restrict free water intake, [facility] for follow-up; will follow with him being on escitalopram as well.  TSH was normal  -     Comprehensive Metabolic Panel; Future; Expected date: 2023    Overweight (BMI 25.0-29.9); Caloric restriction w regular exercise and weight reduction.     Former smoker, stopped smoking in distant past; occasionally resumes use losenges to help him stay off.  Two pack per day for 20 years 40 pack year smoking history.  Wants to wait on low-dose CT of the chest for lung cancer screening    Prostate cancer screenin/15/2022 PSA 2.4 previously on 2020 was 0.96.  Or an average increase over 12 months of 0.66.  Follow-up PSA as per urology service.  Dad reportedly   of prostate cancer.  CAROL was fine.  Followed by urology Dr. Rios.     Encounter for laboratory test; reviewed w pt and ordered ofr f/u.   -     Lipid Panel; Future; Expected date: 2023  -     Comprehensive Metabolic Panel; Future; Expected date: 2023

## 2023-01-17 NOTE — PATIENT INSTRUCTIONS
Other hyperlipidemia: .Maintain low fat high fiber diet, exercise regularly. Weight reduction where indicated. Cont metamucil daily. Watch diet and exercise more.   -     Lipid Panel; Future; Expected date: 01/17/2023    Essential hypertension; Maintain < 2 Gm Na a day diet, and monitor BP at home; keep a log.  Cont amlodipine and metoprolol.     Hyponatremia: was drinking more then; has quit alcohol. Less free water.   -     Comprehensive Metabolic Panel; Future; Expected date: 01/17/2023    Overweight (BMI 25.0-29.9); Caloric restriction w regular exercise and weight reduction.     Former smoker, stopped smoking in distant past; occasionally resumes use losenges to help him stay off.     Encounter for laboratory test; reviewed w pt and ordered ofr f/u.   -     Lipid Panel; Future; Expected date: 01/17/2023  -     Comprehensive Metabolic Panel; Future; Expected date: 01/17/2023

## 2023-01-29 ENCOUNTER — TELEPHONE (OUTPATIENT)
Dept: FAMILY MEDICINE | Facility: CLINIC | Age: 63
End: 2023-01-29
Payer: COMMERCIAL

## 2023-01-29 NOTE — TELEPHONE ENCOUNTER
Please call patient and tell him to please schedule his follow-up appointment sometime in 3 months instead of 4 upon further review.  I would like him to obtain his labs 1 week prior after an overnight fast these have been ordered but will need to be scheduled

## 2023-02-06 NOTE — TELEPHONE ENCOUNTER
Please send him a letter informing him of my message and asking him to please call the office that we have been trying to reach him

## 2023-04-11 ENCOUNTER — PATIENT MESSAGE (OUTPATIENT)
Dept: ADMINISTRATIVE | Facility: HOSPITAL | Age: 63
End: 2023-04-11
Payer: COMMERCIAL

## 2023-04-28 ENCOUNTER — PATIENT OUTREACH (OUTPATIENT)
Dept: ADMINISTRATIVE | Facility: HOSPITAL | Age: 63
End: 2023-04-28
Payer: COMMERCIAL

## 2023-04-28 NOTE — PROGRESS NOTES
[facility] non-compliant report chart audits for COLON CANCER SCREENING.     Care Everywhere and media, updates requested and reviewed.      Outreach to patient in reference to colon cancer screening.    RE:  Patient needs colon cancer screening    Pt states he has a cologuard fit and will complete    Outreach:  Colon cancer screening

## 2023-05-15 ENCOUNTER — PATIENT MESSAGE (OUTPATIENT)
Dept: ADMINISTRATIVE | Facility: HOSPITAL | Age: 63
End: 2023-05-15
Payer: COMMERCIAL

## 2023-05-15 ENCOUNTER — PATIENT OUTREACH (OUTPATIENT)
Dept: ADMINISTRATIVE | Facility: HOSPITAL | Age: 63
End: 2023-05-15
Payer: COMMERCIAL

## 2023-05-15 NOTE — PROGRESS NOTES
Non-compliant report chart audits for TQR-Tobacco History Questionnaire.         Outreach to patient in reference to updating chart with recent tobacco history.  Attempted to call patient.  No answer, left voice mail.     Message sent to patient's portal.

## 2023-05-20 DIAGNOSIS — I10 UNCONTROLLED HYPERTENSION: ICD-10-CM

## 2023-05-21 RX ORDER — METOPROLOL SUCCINATE 50 MG/1
TABLET, EXTENDED RELEASE ORAL
Qty: 90 TABLET | Refills: 2 | Status: SHIPPED | OUTPATIENT
Start: 2023-05-21 | End: 2023-12-18 | Stop reason: SDUPTHER

## 2023-05-21 NOTE — TELEPHONE ENCOUNTER
Refill Decision Note   Marshall Victor  is requesting a refill authorization.  Brief Assessment and Rationale for Refill:  Approve     Medication Therapy Plan:       Medication Reconciliation Completed: No   Comments:     No Care Gaps recommended.     Note composed:10:23 AM 05/21/2023

## 2023-05-21 NOTE — TELEPHONE ENCOUNTER
No care due was identified.  Health [facility] Embedded Care Due Messages. Reference number: [id number].   5/21/2023 10:18:45 AM CDT

## 2023-05-22 RX ORDER — AMLODIPINE BESYLATE 10 MG/1
TABLET ORAL
Qty: 90 TABLET | Refills: 2 | Status: SHIPPED | OUTPATIENT
Start: 2023-05-22 | End: 2023-12-18 | Stop reason: SDUPTHER

## 2023-05-22 NOTE — TELEPHONE ENCOUNTER
No care due was identified.  Health [facility] Embedded Care Due Messages. Reference number: [id number].   5/22/2023 6:55:42 AM CDT

## 2023-05-22 NOTE — TELEPHONE ENCOUNTER
Refill Decision Note   Marshall Victor  is requesting a refill authorization.  Brief Assessment and Rationale for Refill:  Approve     Medication Therapy Plan:       Medication Reconciliation Completed: No   Comments:     No Care Gaps recommended.     Note composed:8:50 AM 05/22/2023

## 2023-06-09 ENCOUNTER — PATIENT OUTREACH (OUTPATIENT)
Dept: ADMINISTRATIVE | Facility: HOSPITAL | Age: 63
End: 2023-06-09
Payer: COMMERCIAL

## 2023-06-09 NOTE — PROGRESS NOTES
Tobacco History needs to be reviewed with patient.  Is patient still a current smoker. LEFT MESSAGE TO RETURN CALL 2nd ATTEMPT 6/9/23      Smoking:   Never, Former, Every Day, Some Days, Light Smoker  Types: Cigarettes, Pipe, Cigars, Vaping with nicotine, Vaping without nicotine  Smokeless Tobacco: Never, Former, Current, Unknown    Population Health Chart Review & Patient Outreach Details:     Reason for Outreach Encounter:     [x]  Non-Compliant Report   []  Payor Report ([employer], PHN, BCBS, MSSP, MCIP, [facility], etc.)   []  Pre-Visit Chart Review     Updates Requested / Reviewed:     []  Care Everywhere    []     []  External Sources ([facility], [employer], DIS, etc.)   []  Care Team Updated    Patient Outreach Method:    [x]  Telephone Outreach Completed   [] Successful   [x] Left Voicemail   [] Unable to Contact (wrong number, no voicemail)  []  [facility] Outreach Sent  []  Letter Outreach Mailed  []  Fax Sent for External Records  []  External Records Upload    Health Maintenance Topics Addressed and Outreach Outcomes / Actions Taken:        []      Breast Cancer Screening []  Mammo Scheduled      []  External Records Requested     []  Added Reminder to Complete to Upcoming Primary Care Appt Notes     []  Patient Declined     []  Patient Will Call Back to Schedule     []  Patient Will Schedule with External Provider / Order Routed if Applicable             []       Cervical Cancer Screening []  Pap Scheduled      []  External Records Requested     []  Added Reminder to Complete to Upcoming Primary Care Appt Notes     []  Patient Declined     []  Patient Will Call Back to Schedule     []  Patient Will Schedule with External Provider               []          Colorectal Cancer Screening []  Colonoscopy Case Request or Referral Placed     []  External Records Requested     []  Added Reminder to Complete to Upcoming Primary Care Appt Notes     []  Patient Declined     []  Patient Will Call Back to Schedule      []  Patient Will Schedule with External Provider     []  Fit Kit Mailed (add the SmartPhrase under additional notes)     []  Reminded Patient to Complete Home Test             []      Diabetic Eye Exam []  Eye Camera Scheduled or Optometry Referral Placed     []  External Records Requested     []  Added Reminder to Complete to Upcoming Primary Care Appt Notes     []  Patient Declined     []  Patient Will Call Back to Schedule     []  Patient Will Schedule with External Provider             []      Blood Pressure Control []  Primary Care Follow Up Visit Scheduled     []  Remote Blood Pressure Reading Captured     []  Added Reminder to Complete to Upcoming Primary Care Appt Notes     []  Patient Declined     []  Patient Will Call Back / Patient Will Send Portal Message with Reading     []  Patient Will Call Back to Schedule Provider Visit             []       HbA1c & Other Labs []  Lab Appt Scheduled for Due Labs     []  Primary Care Follow Up Visit Scheduled      []  Reminded Patient to Complete Home Test     []  Added Reminder to Complete to Upcoming Primary Care Appt Notes     []  Patient Declined     []  Patient Will Call Back to Schedule     []  Patient Will Schedule with External Provider / Order Routed if Applicable           []    Schedule Primary Care Appt []  Primary Care Appt Scheduled     []  Patient Declined     []  Patient Will Call Back to Schedule     []  Pt Established with External Provider & Updated Care Team             []      Medication Adherence []  Primary Care Appointment Scheduled     []  Added Reminder to Upcoming Primary Care Appt Notes     []  Patient Reminded to  Prescription     []  Patient Declined, Provider Notified if Needed     []  Sent Provider Message to Review and/or Add Exclusion to Problem List             []      Osteoporosis Screening []  DXA Appointment Scheduled     []  External Records Requested     []  Added Reminder to Complete to Upcoming Primary Care Appt  Notes     []  Patient Declined     []  Patient Will Call Back to Schedule     []  Patient Will Schedule with External Provider / Order Routed if Applicable     Additional Care Coordinator Notes:         Further Action Needed If Patient Returns Outreach:

## 2023-08-08 ENCOUNTER — TELEPHONE (OUTPATIENT)
Dept: FAMILY MEDICINE | Facility: CLINIC | Age: 63
End: 2023-08-08
Payer: COMMERCIAL

## 2023-08-08 ENCOUNTER — PATIENT OUTREACH (OUTPATIENT)
Dept: ADMINISTRATIVE | Facility: HOSPITAL | Age: 63
End: 2023-08-08
Payer: COMMERCIAL

## 2023-08-08 ENCOUNTER — TELEPHONE (OUTPATIENT)
Dept: ADMINISTRATIVE | Facility: HOSPITAL | Age: 63
End: 2023-08-08
Payer: COMMERCIAL

## 2023-08-08 DIAGNOSIS — F17.210 CIGARETTE NICOTINE DEPENDENCE WITHOUT COMPLICATION: Primary | ICD-10-CM

## 2023-08-08 NOTE — PROGRESS NOTES
Tobacco History needs to be reviewed with patient.  Is patient still a current smoker.8/8/23 still smoker,  smoking cessation ordered  Smoking:   , Some Days,   Types: Cigarettes,

## 2023-08-08 NOTE — TELEPHONE ENCOUNTER
----- Message from Silvia Vinson sent at 8/8/2023  1:18 PM CDT -----  Type:  Sooner Appointment Request    Caller is requesting a sooner appointment.  Caller declined first available appointment listed below.  Caller will not accept being placed on the waitlist and is requesting a message be sent to doctor.    Name of Caller:  pt   When is the first available appointment?  unk  Symptoms:  check up  Best Call Back Number:  [phone] (home)     Additional Information:  requesting a call back , pt wans lab orders psa and appt.. requesting same day appt as danuta Victor his courtney ...please advise thank you

## 2023-08-11 NOTE — TELEPHONE ENCOUNTER
Attempted to contact pt. No answer, unable to leave message.     You can access the FollowMyHealth Patient Portal offered by [facility] by registering at the following website: [email or web address]. By joining [employer]s FollowMyHealth portal, you will also be able to view your health information using other applications (apps) compatible with our system.

## 2023-08-11 NOTE — TELEPHONE ENCOUNTER
Please call patient and make an appointment for him to be enrolled in [facility] smoking cessation program.

## 2023-08-14 ENCOUNTER — PATIENT MESSAGE (OUTPATIENT)
Dept: PRIMARY CARE CLINIC | Facility: CLINIC | Age: 63
End: 2023-08-14
Payer: COMMERCIAL

## 2023-08-14 DIAGNOSIS — Z80.42 FAMILY HISTORY OF PROSTATE CANCER IN FATHER: ICD-10-CM

## 2023-08-14 DIAGNOSIS — E78.49 OTHER HYPERLIPIDEMIA: ICD-10-CM

## 2023-08-14 DIAGNOSIS — D75.839 THROMBOCYTOSIS: ICD-10-CM

## 2023-08-14 DIAGNOSIS — R97.20 INCREASED PROSTATE SPECIFIC ANTIGEN (PSA) VELOCITY: Primary | ICD-10-CM

## 2023-08-14 DIAGNOSIS — F41.9 ANXIETY: ICD-10-CM

## 2023-08-15 NOTE — TELEPHONE ENCOUNTER
Please add a CBC with TSH and PSA diagnostic to his previously ordered labs by me from 01/17/2023 to be done together as his scheduled labs before his next follow-up with primary care.  Wish him all the best in his health and life for me since I am retiring shortly.  Please send the results of his labs into the inbox of his new PCP.  Thank you

## 2023-08-31 ENCOUNTER — LAB VISIT (OUTPATIENT)
Dept: LAB | Facility: HOSPITAL | Age: 63
End: 2023-08-31
Attending: INTERNAL MEDICINE
Payer: COMMERCIAL

## 2023-08-31 DIAGNOSIS — R97.20 INCREASED PROSTATE SPECIFIC ANTIGEN (PSA) VELOCITY: ICD-10-CM

## 2023-08-31 DIAGNOSIS — D75.839 THROMBOCYTOSIS: ICD-10-CM

## 2023-08-31 DIAGNOSIS — E87.1 HYPONATREMIA: ICD-10-CM

## 2023-08-31 DIAGNOSIS — Z80.42 FAMILY HISTORY OF PROSTATE CANCER IN FATHER: ICD-10-CM

## 2023-08-31 DIAGNOSIS — Z01.89 ENCOUNTER FOR LABORATORY TEST: ICD-10-CM

## 2023-08-31 DIAGNOSIS — E78.49 OTHER HYPERLIPIDEMIA: ICD-10-CM

## 2023-08-31 DIAGNOSIS — F41.9 ANXIETY: ICD-10-CM

## 2023-08-31 LAB
ALBUMIN SERPL BCP-MCNC: 4.1 G/DL (ref 3.5–5.2)
ALP SERPL-CCNC: 58 U/L (ref 55–135)
ALT SERPL W/O P-5'-P-CCNC: 21 U/L (ref 10–44)
ANION GAP SERPL CALC-SCNC: 11 MMOL/L (ref 8–16)
AST SERPL-CCNC: 16 U/L (ref 10–40)
BASOPHILS # BLD AUTO: 0.07 K/UL (ref 0–0.2)
BASOPHILS NFR BLD: 0.7 % (ref 0–1.9)
BILIRUB SERPL-MCNC: 0.6 MG/DL (ref 0.1–1)
BUN SERPL-MCNC: 11 MG/DL (ref 8–23)
CALCIUM SERPL-MCNC: 9.4 MG/DL (ref 8.7–10.5)
CHLORIDE SERPL-SCNC: 101 MMOL/L (ref 95–110)
CO2 SERPL-SCNC: 21 MMOL/L (ref 23–29)
COMPLEXED PSA SERPL-MCNC: 1.4 NG/ML (ref 0–4)
CREAT SERPL-MCNC: 0.8 MG/DL (ref 0.5–1.4)
DIFFERENTIAL METHOD: ABNORMAL
EOSINOPHIL # BLD AUTO: 0.1 K/UL (ref 0–0.5)
EOSINOPHIL NFR BLD: 1 % (ref 0–8)
ERYTHROCYTE [DISTWIDTH] IN BLOOD BY AUTOMATED COUNT: 13 % (ref 11.5–14.5)
EST. GFR  (NO RACE VARIABLE): >60 ML/MIN/1.73 M^2
GLUCOSE SERPL-MCNC: 102 MG/DL (ref 70–110)
HCT VFR BLD AUTO: 46.4 % (ref 40–54)
HGB BLD-MCNC: 15.4 G/DL (ref 14–18)
IMM GRANULOCYTES # BLD AUTO: 0.05 K/UL (ref 0–0.04)
IMM GRANULOCYTES NFR BLD AUTO: 0.5 % (ref 0–0.5)
LYMPHOCYTES # BLD AUTO: 2.6 K/UL (ref 1–4.8)
LYMPHOCYTES NFR BLD: 24.6 % (ref 18–48)
MCH RBC QN AUTO: 30.1 PG (ref 27–31)
MCHC RBC AUTO-ENTMCNC: 33.2 G/DL (ref 32–36)
MCV RBC AUTO: 91 FL (ref 82–98)
MONOCYTES # BLD AUTO: 1 K/UL (ref 0.3–1)
MONOCYTES NFR BLD: 9.5 % (ref 4–15)
NEUTROPHILS # BLD AUTO: 6.6 K/UL (ref 1.8–7.7)
NEUTROPHILS NFR BLD: 63.7 % (ref 38–73)
NRBC BLD-RTO: 0 /100 WBC
PLATELET # BLD AUTO: 325 K/UL (ref 150–450)
PMV BLD AUTO: 10.4 FL (ref 9.2–12.9)
POTASSIUM SERPL-SCNC: 4.9 MMOL/L (ref 3.5–5.1)
PROT SERPL-MCNC: 7.4 G/DL (ref 6–8.4)
RBC # BLD AUTO: 5.12 M/UL (ref 4.6–6.2)
SODIUM SERPL-SCNC: 133 MMOL/L (ref 136–145)
TSH SERPL DL<=0.005 MIU/L-ACNC: 1.11 UIU/ML (ref 0.4–4)
WBC # BLD AUTO: 10.37 K/UL (ref 3.9–12.7)

## 2023-08-31 PROCEDURE — 84443 ASSAY THYROID STIM HORMONE: CPT | Performed by: INTERNAL MEDICINE

## 2023-08-31 PROCEDURE — 84153 ASSAY OF PSA TOTAL: CPT | Performed by: INTERNAL MEDICINE

## 2023-08-31 PROCEDURE — 85025 COMPLETE CBC W/AUTO DIFF WBC: CPT | Performed by: INTERNAL MEDICINE

## 2023-08-31 PROCEDURE — 80053 COMPREHEN METABOLIC PANEL: CPT | Performed by: INTERNAL MEDICINE

## 2023-09-09 ENCOUNTER — TELEPHONE (OUTPATIENT)
Dept: FAMILY MEDICINE | Facility: CLINIC | Age: 63
End: 2023-09-09
Payer: COMMERCIAL

## 2023-09-09 NOTE — TELEPHONE ENCOUNTER
----- Message from Trisha Robbins DNP, JOSUE sent at 9/4/2023  9:40 PM CDT -----  Patient has not been seen by PCP since 01/17/2023. Dr. Falrey is retiring and patient needs to establish with new PCP. The results were forwarded to EMERSON Bowens but she is no longer practicing inside the primary care full time and is only here PRN. Please notify please to establish with a new primary care and assist with making an appt.

## 2023-11-04 ENCOUNTER — PATIENT MESSAGE (OUTPATIENT)
Dept: ADMINISTRATIVE | Facility: HOSPITAL | Age: 63
End: 2023-11-04
Payer: COMMERCIAL

## 2023-12-18 ENCOUNTER — OFFICE VISIT (OUTPATIENT)
Dept: FAMILY MEDICINE | Facility: CLINIC | Age: 63
End: 2023-12-18
Payer: COMMERCIAL

## 2023-12-18 VITALS
HEART RATE: 68 BPM | DIASTOLIC BLOOD PRESSURE: 74 MMHG | BODY MASS INDEX: 27.4 KG/M2 | HEIGHT: 71 IN | WEIGHT: 195.69 LBS | OXYGEN SATURATION: 99 % | SYSTOLIC BLOOD PRESSURE: 130 MMHG

## 2023-12-18 DIAGNOSIS — Z87.891 HISTORY OF SMOKING AT LEAST 1 PACK PER DAY FOR AT LEAST 30 YEARS: Chronic | ICD-10-CM

## 2023-12-18 DIAGNOSIS — Z13.1 ENCOUNTER FOR SCREENING FOR DIABETES MELLITUS: ICD-10-CM

## 2023-12-18 DIAGNOSIS — Z12.11 COLON CANCER SCREENING: ICD-10-CM

## 2023-12-18 DIAGNOSIS — Z00.00 PREVENTATIVE HEALTH CARE: Primary | ICD-10-CM

## 2023-12-18 DIAGNOSIS — I10 ESSENTIAL HYPERTENSION: Chronic | ICD-10-CM

## 2023-12-18 DIAGNOSIS — F41.1 GAD (GENERALIZED ANXIETY DISORDER): Chronic | ICD-10-CM

## 2023-12-18 DIAGNOSIS — Z13.220 ENCOUNTER FOR SCREENING FOR LIPID DISORDER: ICD-10-CM

## 2023-12-18 PROBLEM — E78.49 OTHER HYPERLIPIDEMIA: Chronic | Status: RESOLVED | Noted: 2019-08-09 | Resolved: 2023-12-18

## 2023-12-18 PROBLEM — E78.49 OTHER HYPERLIPIDEMIA: Chronic | Status: ACTIVE | Noted: 2019-08-09

## 2023-12-18 PROBLEM — E87.1 HYPONATREMIA: Status: RESOLVED | Noted: 2018-02-06 | Resolved: 2023-12-18

## 2023-12-18 PROCEDURE — 1159F PR MEDICATION LIST DOCUMENTED IN MEDICAL RECORD: ICD-10-PCS | Mod: CPTII,S$GLB,, | Performed by: STUDENT IN AN ORGANIZED HEALTH CARE EDUCATION/TRAINING PROGRAM

## 2023-12-18 PROCEDURE — 3075F SYST BP GE 130 - 139MM HG: CPT | Mod: CPTII,S$GLB,, | Performed by: STUDENT IN AN ORGANIZED HEALTH CARE EDUCATION/TRAINING PROGRAM

## 2023-12-18 PROCEDURE — 99396 PREV VISIT EST AGE 40-64: CPT | Mod: S$GLB,,, | Performed by: STUDENT IN AN ORGANIZED HEALTH CARE EDUCATION/TRAINING PROGRAM

## 2023-12-18 PROCEDURE — 3078F DIAST BP <80 MM HG: CPT | Mod: CPTII,S$GLB,, | Performed by: STUDENT IN AN ORGANIZED HEALTH CARE EDUCATION/TRAINING PROGRAM

## 2023-12-18 PROCEDURE — 3008F BODY MASS INDEX DOCD: CPT | Mod: CPTII,S$GLB,, | Performed by: STUDENT IN AN ORGANIZED HEALTH CARE EDUCATION/TRAINING PROGRAM

## 2023-12-18 PROCEDURE — 3008F PR BODY MASS INDEX (BMI) DOCUMENTED: ICD-10-PCS | Mod: CPTII,S$GLB,, | Performed by: STUDENT IN AN ORGANIZED HEALTH CARE EDUCATION/TRAINING PROGRAM

## 2023-12-18 PROCEDURE — 3075F PR MOST RECENT SYSTOLIC BLOOD PRESS GE 130-139MM HG: ICD-10-PCS | Mod: CPTII,S$GLB,, | Performed by: STUDENT IN AN ORGANIZED HEALTH CARE EDUCATION/TRAINING PROGRAM

## 2023-12-18 PROCEDURE — 1160F RVW MEDS BY RX/DR IN RCRD: CPT | Mod: CPTII,S$GLB,, | Performed by: STUDENT IN AN ORGANIZED HEALTH CARE EDUCATION/TRAINING PROGRAM

## 2023-12-18 PROCEDURE — 1160F PR REVIEW ALL MEDS BY PRESCRIBER/CLIN PHARMACIST DOCUMENTED: ICD-10-PCS | Mod: CPTII,S$GLB,, | Performed by: STUDENT IN AN ORGANIZED HEALTH CARE EDUCATION/TRAINING PROGRAM

## 2023-12-18 PROCEDURE — 99999 PR PBB SHADOW E&M-EST. PATIENT-LVL III: CPT | Mod: PBBFAC,,, | Performed by: STUDENT IN AN ORGANIZED HEALTH CARE EDUCATION/TRAINING PROGRAM

## 2023-12-18 PROCEDURE — 3078F PR MOST RECENT DIASTOLIC BLOOD PRESSURE < 80 MM HG: ICD-10-PCS | Mod: CPTII,S$GLB,, | Performed by: STUDENT IN AN ORGANIZED HEALTH CARE EDUCATION/TRAINING PROGRAM

## 2023-12-18 PROCEDURE — 1159F MED LIST DOCD IN RCRD: CPT | Mod: CPTII,S$GLB,, | Performed by: STUDENT IN AN ORGANIZED HEALTH CARE EDUCATION/TRAINING PROGRAM

## 2023-12-18 PROCEDURE — 99396 PR PREVENTIVE VISIT,EST,40-64: ICD-10-PCS | Mod: S$GLB,,, | Performed by: STUDENT IN AN ORGANIZED HEALTH CARE EDUCATION/TRAINING PROGRAM

## 2023-12-18 PROCEDURE — 99999 PR PBB SHADOW E&M-EST. PATIENT-LVL III: ICD-10-PCS | Mod: PBBFAC,,, | Performed by: STUDENT IN AN ORGANIZED HEALTH CARE EDUCATION/TRAINING PROGRAM

## 2023-12-18 RX ORDER — AMLODIPINE BESYLATE 10 MG/1
10 TABLET ORAL DAILY
Qty: 90 TABLET | Refills: 3 | Status: SHIPPED | OUTPATIENT
Start: 2023-12-18 | End: 2024-12-12

## 2023-12-18 RX ORDER — METOPROLOL SUCCINATE 50 MG/1
50 TABLET, EXTENDED RELEASE ORAL DAILY
Qty: 90 TABLET | Refills: 3 | Status: SHIPPED | OUTPATIENT
Start: 2023-12-18 | End: 2024-12-12

## 2023-12-18 NOTE — PROGRESS NOTES
Plan:     Marshall was seen today for [facility].    Diagnoses and all orders for this visit:    Preventative health care  -     HIV 1/2 Ag/Ab (4th Gen); Future  -     Hemoglobin A1C; Future  -     Lipid Panel; Future  -     Comprehensive Metabolic Panel; Future  -     CBC Auto Differential; Future    Encounter for screening for diabetes mellitus  -     Hemoglobin A1C; Future  -     Comprehensive Metabolic Panel; Future    Encounter for screening for lipid disorder  -     Lipid Panel; Future    History of smoking at least 1 pack per day for at least 30 years  -     CBC Auto Differential; Future  -     CT Chest Lung Screening Low Dose; Future    ALYSON (generalized anxiety disorder)  -     Comprehensive Metabolic Panel; Future    Essential hypertension  -     Comprehensive Metabolic Panel; Future  -     metoprolol succinate (TOPROL-XL) 50 MG 24 hr tablet; Take 1 tablet (50 mg total) by mouth once daily.  -     amLODIPine (NORVASC) 10 MG tablet; Take 1 tablet (10 mg total) by mouth once daily.    Colon cancer screening  -     Cologuard Screening (Multitarget Stool DNA); Future  -     Cologuard Screening (Multitarget Stool DNA)       Follow up in about 1 year (around 12/18/2024), or if symptoms worsen or fail to improve.    Raven Whitman MD  12/18/2023    Subjective:      Patient ID: Marshall Victor is a 63 y.o. male    Chief Complaint   Patient presents with    [facility]     Est care      HPI  63 y.o. male with a PMHx as documented below presents to clinic today for the following:    Annual exam, no specific concerns at this time. No active/long-term medical conditions, no daily medications.     HTN:   - Amlodipine 10 mg daily   - Toprol- XL 50 mg daily     ROS  Constitutional:  Negative for chills and fever.   Respiratory:  Negative for shortness of breath.    Cardiovascular:  Negative for chest pain.   Gastrointestinal:  Negative for abdominal pain, constipation, diarrhea, nausea and vomiting.     Current  Outpatient Medications   Medication Instructions    amLODIPine (NORVASC) 10 mg, Oral, Daily    metoprolol succinate (TOPROL-XL) 50 mg, Oral, Daily      Past Medical History:   Diagnosis Date    Essential hypertension 08/09/2019    ALYSON (generalized anxiety disorder) 08/04/2020    History of smoking at least 1 pack per day for at least 30 years 12/18/2023    Other hyperlipidemia 08/09/2019    Squamous cell carcinoma, face 2011    s/p excision    White coat syndrome with diagnosis of hypertension 02/06/2018     Past Surgical History:   Procedure Laterality Date    dental implants  12/08/2023       Review of patient's allergies indicates:   Allergen Reactions    Penicillins      As a child broke out     Family History   Problem Relation Age of Onset    Arthritis Mother     Bladder Cancer Father         passed away age 59    Prostate cancer Father     Lung cancer Maternal Grandfather         passed away age 77; Hx of tobacco use     Social History     Tobacco Use    Smoking status: Passive Smoke Exposure - Never Smoker    Smokeless tobacco: Never    Tobacco comments:     down to 1 pack a week for cigarettes   Substance Use Topics    Alcohol use: Yes     Alcohol/week: 5.0 standard drinks of alcohol     Types: 5 Cans of beer per week     Comment: [facility]    Drug use: No     Currently on File with [facility]:   Most Recent Immunizations   Administered Date(s) Administered    COVID-19 MRNA, LN-S PF (MODERNA HALF 0.25 ML DOSE) 07/13/2022    COVID-19 Vaccine 07/13/2022    COVID-19, MRNA, LN-S, PF (MODERNA FULL 0.5 ML DOSE) 02/19/2021    Influenza (FLUBLOK) - Quadrivalent - Recombinant - PF *Preferred* (egg allergy) 10/14/2022    Influenza - Quadrivalent - PF *Preferred* (6 months and older) 11/06/2021    Pneumococcal Polysaccharide - 23 Valent 01/02/2019    Tdap 12/08/2017    Zoster Recombinant 10/14/2022     Objective:      Vitals:    12/18/23 0929   BP: 130/74   Pulse: 68   SpO2: 99%   Weight: 88.7 kg (195 lb 10.5 oz)  "  Height: 5' 11" (1.803 m)     Body mass index is 27.29 kg/m².    Physical Exam   Constitutional:       General: No acute distress.  HENT:      Head: Normocephalic and atraumatic.   Pulmonary:      Effort: Pulmonary effort is normal. No respiratory distress.   Neurological:      General: No focal deficit present.      Mental Status: Alert and oriented to person, place, and time. Mental status is at baseline.    Assessment:       1. Preventative health care    2. Encounter for screening for diabetes mellitus    3. Encounter for screening for lipid disorder    4. History of smoking at least 1 pack per day for at least 30 years    5. ALYSON (generalized anxiety disorder)    6. Essential hypertension    7. Colon cancer screening        Raven Whitman MD  [facility]  Office: [phone]   Fax: [phone]  12/18/2023      Disclaimer: This note was partly generated using dictation software which may occasionally result in transcription errors.    Total time spent on this encounter includes face to face time and non-face to face time preparing to see the patient (eg, review of tests), obtaining and/or reviewing separately obtained history, documenting clinical information in the electronic or other health record, independently interpreting results, and communicating results to the patient/family/caregiver, or care coordinator.    "

## 2025-02-18 DIAGNOSIS — I10 ESSENTIAL HYPERTENSION: Chronic | ICD-10-CM

## 2025-02-18 NOTE — TELEPHONE ENCOUNTER
Care Due:                  Date            Visit Type   Department     Provider  --------------------------------------------------------------------------------                                             [facility]  Last Visit: 12-      [facility]       Raven Whitman  Next Visit: None Scheduled  None         None Found                                                            Last  Test          Frequency    Reason                     Performed    Due Date  --------------------------------------------------------------------------------    Office Visit  15 months..  amLODIPine, metoprolol...  12- 03-    [facility] Embedded Care Due Messages. Reference number: [id number].   2/18/2025 11:55:29 AM CST

## 2025-02-19 DIAGNOSIS — I10 ESSENTIAL HYPERTENSION: ICD-10-CM

## 2025-02-19 RX ORDER — METOPROLOL SUCCINATE 50 MG/1
50 TABLET, EXTENDED RELEASE ORAL DAILY
Qty: 90 TABLET | Refills: 0 | Status: SHIPPED | OUTPATIENT
Start: 2025-02-19

## 2025-02-19 NOTE — TELEPHONE ENCOUNTER
One-time refill request approved - patient will need to schedule clinic appointment prior to additional medication refills.     Raven Whitman MD  [facility]  Office: [phone]   Fax: [phone]  02/19/2025

## 2025-02-19 NOTE — TELEPHONE ENCOUNTER
Refill Routing Note   Medication(s) are not appropriate for processing by [facility] for the following reason(s):        Required vitals outdated    [facility] action(s):  Defer   Requires appointment : Yes             Appointments  past 12m or future 3m with PCP    Date Provider   Last Visit   12/18/2023 Raven Whitman MD   Next Visit   Visit date not found Raven Whitman MD   ED visits in past 90 days: 0        Note composed:10:51 PM 02/18/2025

## 2025-06-06 ENCOUNTER — OFFICE VISIT (OUTPATIENT)
Dept: FAMILY MEDICINE | Facility: CLINIC | Age: 65
End: 2025-06-06
Payer: COMMERCIAL

## 2025-06-06 VITALS
BODY MASS INDEX: 29.94 KG/M2 | DIASTOLIC BLOOD PRESSURE: 72 MMHG | WEIGHT: 213.88 LBS | SYSTOLIC BLOOD PRESSURE: 138 MMHG | HEART RATE: 94 BPM | HEIGHT: 71 IN | OXYGEN SATURATION: 98 %

## 2025-06-06 DIAGNOSIS — K59.00 CONSTIPATION, UNSPECIFIED CONSTIPATION TYPE: ICD-10-CM

## 2025-06-06 DIAGNOSIS — I10 ESSENTIAL HYPERTENSION: Chronic | ICD-10-CM

## 2025-06-06 DIAGNOSIS — R09.82 POST-NASAL DRIP: ICD-10-CM

## 2025-06-06 DIAGNOSIS — E78.49 OTHER HYPERLIPIDEMIA: ICD-10-CM

## 2025-06-06 DIAGNOSIS — R10.13 EPIGASTRIC PAIN: ICD-10-CM

## 2025-06-06 DIAGNOSIS — Z00.00 ROUTINE GENERAL MEDICAL EXAMINATION AT A HEALTH CARE FACILITY: Primary | ICD-10-CM

## 2025-06-06 DIAGNOSIS — R14.0 BLOATING: ICD-10-CM

## 2025-06-06 DIAGNOSIS — R97.20 INCREASED PROSTATE SPECIFIC ANTIGEN (PSA) VELOCITY: ICD-10-CM

## 2025-06-06 DIAGNOSIS — Z12.11 COLON CANCER SCREENING: ICD-10-CM

## 2025-06-06 DIAGNOSIS — M54.50 ACUTE BILATERAL LOW BACK PAIN WITHOUT SCIATICA: ICD-10-CM

## 2025-06-06 PROCEDURE — 99999 PR PBB SHADOW E&M-EST. PATIENT-LVL IV: CPT | Mod: PBBFAC,,, | Performed by: NURSE PRACTITIONER

## 2025-06-06 RX ORDER — METOPROLOL SUCCINATE 50 MG/1
50 TABLET, EXTENDED RELEASE ORAL DAILY
Qty: 90 TABLET | Refills: 1 | Status: SHIPPED | OUTPATIENT
Start: 2025-06-06

## 2025-06-06 RX ORDER — OMEPRAZOLE 20 MG/1
20 CAPSULE, DELAYED RELEASE ORAL DAILY
Qty: 90 CAPSULE | Refills: 3 | Status: SHIPPED | OUTPATIENT
Start: 2025-06-06 | End: 2026-06-06

## 2025-06-06 RX ORDER — AMLODIPINE BESYLATE 10 MG/1
10 TABLET ORAL DAILY
Qty: 90 TABLET | Refills: 1 | Status: SHIPPED | OUTPATIENT
Start: 2025-06-06 | End: 2026-06-01

## 2025-06-06 RX ORDER — PROMETHAZINE HYDROCHLORIDE AND DEXTROMETHORPHAN HYDROBROMIDE 6.25; 15 MG/5ML; MG/5ML
5-10 SYRUP ORAL
COMMUNITY
Start: 2025-04-28

## 2025-06-06 RX ORDER — METHOCARBAMOL 500 MG/1
500 TABLET, FILM COATED ORAL 4 TIMES DAILY
Qty: 40 TABLET | Refills: 0 | Status: SHIPPED | OUTPATIENT
Start: 2025-06-06 | End: 2025-06-16

## 2025-06-06 RX ORDER — AZELASTINE 1 MG/ML
1 SPRAY, METERED NASAL DAILY
Qty: 30 ML | Refills: 0 | Status: SHIPPED | OUTPATIENT
Start: 2025-06-06 | End: 2026-06-06

## 2025-06-08 ENCOUNTER — PATIENT MESSAGE (OUTPATIENT)
Dept: FAMILY MEDICINE | Facility: CLINIC | Age: 65
End: 2025-06-08
Payer: COMMERCIAL

## 2025-06-08 PROBLEM — R74.01 TRANSAMINASEMIA: Status: ACTIVE | Noted: 2025-06-08

## 2025-06-08 PROBLEM — F17.200 TOBACCO DEPENDENCY: Status: ACTIVE | Noted: 2025-06-08

## 2025-06-08 PROBLEM — I50.9 CONGESTIVE HEART FAILURE: Status: ACTIVE | Noted: 2025-06-08

## 2025-06-08 PROBLEM — I48.91 ATRIAL FIBRILLATION: Status: ACTIVE | Noted: 2025-06-08

## 2025-06-08 PROBLEM — I10 ESSENTIAL (PRIMARY) HYPERTENSION: Status: ACTIVE | Noted: 2025-06-08

## 2025-06-13 ENCOUNTER — LAB VISIT (OUTPATIENT)
Dept: LAB | Facility: HOSPITAL | Age: 65
End: 2025-06-13
Attending: NURSE PRACTITIONER
Payer: COMMERCIAL

## 2025-06-13 DIAGNOSIS — Z00.00 ROUTINE GENERAL MEDICAL EXAMINATION AT A HEALTH CARE FACILITY: ICD-10-CM

## 2025-06-13 DIAGNOSIS — E78.49 OTHER HYPERLIPIDEMIA: ICD-10-CM

## 2025-06-13 DIAGNOSIS — R14.0 BLOATING: ICD-10-CM

## 2025-06-13 DIAGNOSIS — K59.00 CONSTIPATION, UNSPECIFIED CONSTIPATION TYPE: ICD-10-CM

## 2025-06-13 DIAGNOSIS — R97.20 INCREASED PROSTATE SPECIFIC ANTIGEN (PSA) VELOCITY: ICD-10-CM

## 2025-06-13 LAB
ABSOLUTE EOSINOPHIL (OHS): 0.09 K/UL
ABSOLUTE MONOCYTE (OHS): 1.18 K/UL (ref 0.3–1)
ABSOLUTE NEUTROPHIL COUNT (OHS): 5.8 K/UL (ref 1.8–7.7)
ALBUMIN SERPL BCP-MCNC: 3.5 G/DL (ref 3.5–5.2)
ALP SERPL-CCNC: 67 UNIT/L (ref 40–150)
ALT SERPL W/O P-5'-P-CCNC: 46 UNIT/L (ref 10–44)
ANION GAP (OHS): 9 MMOL/L (ref 8–16)
AST SERPL-CCNC: 27 UNIT/L (ref 11–45)
BASOPHILS # BLD AUTO: 0.07 K/UL
BASOPHILS NFR BLD AUTO: 0.8 %
BILIRUB SERPL-MCNC: 0.6 MG/DL (ref 0.1–1)
BUN SERPL-MCNC: 16 MG/DL (ref 8–23)
CALCIUM SERPL-MCNC: 8.7 MG/DL (ref 8.7–10.5)
CHLORIDE SERPL-SCNC: 92 MMOL/L (ref 95–110)
CHOLEST SERPL-MCNC: 106 MG/DL (ref 120–199)
CHOLEST/HDLC SERPL: 2.7 {RATIO} (ref 2–5)
CO2 SERPL-SCNC: 25 MMOL/L (ref 23–29)
CREAT SERPL-MCNC: 1.1 MG/DL (ref 0.5–1.4)
ERYTHROCYTE [DISTWIDTH] IN BLOOD BY AUTOMATED COUNT: 13.3 % (ref 11.5–14.5)
GFR SERPLBLD CREATININE-BSD FMLA CKD-EPI: >60 ML/MIN/1.73/M2
GLUCOSE SERPL-MCNC: 101 MG/DL (ref 70–110)
HCT VFR BLD AUTO: 43.5 % (ref 40–54)
HDLC SERPL-MCNC: 39 MG/DL (ref 40–75)
HDLC SERPL: 36.8 % (ref 20–50)
HGB BLD-MCNC: 14.4 GM/DL (ref 14–18)
IMM GRANULOCYTES # BLD AUTO: 0.03 K/UL (ref 0–0.04)
IMM GRANULOCYTES NFR BLD AUTO: 0.3 % (ref 0–0.5)
LDLC SERPL CALC-MCNC: 55.6 MG/DL (ref 63–159)
LYMPHOCYTES # BLD AUTO: 1.86 K/UL (ref 1–4.8)
MCH RBC QN AUTO: 29.9 PG (ref 27–31)
MCHC RBC AUTO-ENTMCNC: 33.1 G/DL (ref 32–36)
MCV RBC AUTO: 90 FL (ref 82–98)
NONHDLC SERPL-MCNC: 67 MG/DL
NUCLEATED RBC (/100WBC) (OHS): 0 /100 WBC
PLATELET # BLD AUTO: 304 K/UL (ref 150–450)
PMV BLD AUTO: 10.2 FL (ref 9.2–12.9)
POTASSIUM SERPL-SCNC: 4.2 MMOL/L (ref 3.5–5.1)
PROT SERPL-MCNC: 6 GM/DL (ref 6–8.4)
PSA SERPL-MCNC: 0.82 NG/ML
RBC # BLD AUTO: 4.82 M/UL (ref 4.6–6.2)
RELATIVE EOSINOPHIL (OHS): 1 %
RELATIVE LYMPHOCYTE (OHS): 20.6 % (ref 18–48)
RELATIVE MONOCYTE (OHS): 13.1 % (ref 4–15)
RELATIVE NEUTROPHIL (OHS): 64.2 % (ref 38–73)
SODIUM SERPL-SCNC: 126 MMOL/L (ref 136–145)
TRIGL SERPL-MCNC: 57 MG/DL (ref 30–150)
TSH SERPL-ACNC: 1.37 UIU/ML (ref 0.4–4)
WBC # BLD AUTO: 9.03 K/UL (ref 3.9–12.7)

## 2025-06-13 PROCEDURE — 82040 ASSAY OF SERUM ALBUMIN: CPT

## 2025-06-13 PROCEDURE — 36415 COLL VENOUS BLD VENIPUNCTURE: CPT | Mod: PN

## 2025-06-13 PROCEDURE — 84153 ASSAY OF PSA TOTAL: CPT

## 2025-06-13 PROCEDURE — 80061 LIPID PANEL: CPT

## 2025-06-13 PROCEDURE — 85025 COMPLETE CBC W/AUTO DIFF WBC: CPT

## 2025-06-13 PROCEDURE — 84443 ASSAY THYROID STIM HORMONE: CPT

## 2025-06-14 PROBLEM — J90 PLEURAL EFFUSION ON LEFT: Status: ACTIVE | Noted: 2025-06-14

## 2025-06-14 PROBLEM — R60.0 LOWER EXTREMITY EDEMA: Status: ACTIVE | Noted: 2025-06-08

## 2025-06-15 PROBLEM — R79.89 ELEVATED BRAIN NATRIURETIC PEPTIDE (BNP) LEVEL: Status: ACTIVE | Noted: 2025-06-15

## 2025-06-15 PROBLEM — R79.89 ELEVATED TROPONIN: Status: ACTIVE | Noted: 2025-06-15

## 2025-06-16 PROBLEM — I21.4 NSTEMI (NON-ST ELEVATED MYOCARDIAL INFARCTION): Status: ACTIVE | Noted: 2025-06-16

## 2025-06-17 ENCOUNTER — PATIENT MESSAGE (OUTPATIENT)
Dept: FAMILY MEDICINE | Facility: CLINIC | Age: 65
End: 2025-06-17
Payer: COMMERCIAL

## 2025-06-21 ENCOUNTER — PATIENT MESSAGE (OUTPATIENT)
Dept: FAMILY MEDICINE | Facility: CLINIC | Age: 65
End: 2025-06-21
Payer: COMMERCIAL

## 2025-06-21 DIAGNOSIS — Z79.899 LONG TERM CURRENT USE OF DIURETIC: ICD-10-CM

## 2025-06-21 DIAGNOSIS — I10 ESSENTIAL HYPERTENSION: Primary | ICD-10-CM

## 2025-06-21 DIAGNOSIS — D64.9 NORMOCYTIC ANEMIA: ICD-10-CM

## 2025-06-21 DIAGNOSIS — E78.49 OTHER HYPERLIPIDEMIA: ICD-10-CM

## 2025-06-24 ENCOUNTER — LAB VISIT (OUTPATIENT)
Dept: LAB | Facility: HOSPITAL | Age: 65
End: 2025-06-24
Attending: STUDENT IN AN ORGANIZED HEALTH CARE EDUCATION/TRAINING PROGRAM
Payer: COMMERCIAL

## 2025-06-24 DIAGNOSIS — I10 ESSENTIAL HYPERTENSION: ICD-10-CM

## 2025-06-24 DIAGNOSIS — Z79.899 LONG TERM CURRENT USE OF DIURETIC: ICD-10-CM

## 2025-06-24 DIAGNOSIS — E78.49 OTHER HYPERLIPIDEMIA: ICD-10-CM

## 2025-06-24 DIAGNOSIS — D64.9 NORMOCYTIC ANEMIA: ICD-10-CM

## 2025-06-24 LAB
ABSOLUTE EOSINOPHIL (OHS): 0.09 K/UL
ABSOLUTE MONOCYTE (OHS): 1.14 K/UL (ref 0.3–1)
ABSOLUTE NEUTROPHIL COUNT (OHS): 6.68 K/UL (ref 1.8–7.7)
ALBUMIN SERPL BCP-MCNC: 3.9 G/DL (ref 3.5–5.2)
ALP SERPL-CCNC: 80 UNIT/L (ref 40–150)
ALT SERPL W/O P-5'-P-CCNC: 29 UNIT/L (ref 10–44)
ANION GAP (OHS): 9 MMOL/L (ref 8–16)
AST SERPL-CCNC: 21 UNIT/L (ref 11–45)
BASOPHILS # BLD AUTO: 0.09 K/UL
BASOPHILS NFR BLD AUTO: 0.9 %
BILIRUB SERPL-MCNC: 1 MG/DL (ref 0.1–1)
BUN SERPL-MCNC: 18 MG/DL (ref 8–23)
CALCIUM SERPL-MCNC: 8.9 MG/DL (ref 8.7–10.5)
CHLORIDE SERPL-SCNC: 94 MMOL/L (ref 95–110)
CO2 SERPL-SCNC: 27 MMOL/L (ref 23–29)
CREAT SERPL-MCNC: 1.2 MG/DL (ref 0.5–1.4)
ERYTHROCYTE [DISTWIDTH] IN BLOOD BY AUTOMATED COUNT: 13.2 % (ref 11.5–14.5)
GFR SERPLBLD CREATININE-BSD FMLA CKD-EPI: >60 ML/MIN/1.73/M2
GLUCOSE SERPL-MCNC: 121 MG/DL (ref 70–110)
HCT VFR BLD AUTO: 45.2 % (ref 40–54)
HGB BLD-MCNC: 14.7 GM/DL (ref 14–18)
IMM GRANULOCYTES # BLD AUTO: 0.05 K/UL (ref 0–0.04)
IMM GRANULOCYTES NFR BLD AUTO: 0.5 % (ref 0–0.5)
LYMPHOCYTES # BLD AUTO: 2.13 K/UL (ref 1–4.8)
MAGNESIUM SERPL-MCNC: 2 MG/DL (ref 1.6–2.6)
MCH RBC QN AUTO: 30.1 PG (ref 27–31)
MCHC RBC AUTO-ENTMCNC: 32.5 G/DL (ref 32–36)
MCV RBC AUTO: 92 FL (ref 82–98)
NUCLEATED RBC (/100WBC) (OHS): 0 /100 WBC
PHOSPHATE SERPL-MCNC: 4.2 MG/DL (ref 2.7–4.5)
PLATELET # BLD AUTO: 338 K/UL (ref 150–450)
PMV BLD AUTO: 10.2 FL (ref 9.2–12.9)
POTASSIUM SERPL-SCNC: 4.4 MMOL/L (ref 3.5–5.1)
PROT SERPL-MCNC: 6.6 GM/DL (ref 6–8.4)
RBC # BLD AUTO: 4.89 M/UL (ref 4.6–6.2)
RELATIVE EOSINOPHIL (OHS): 0.9 %
RELATIVE LYMPHOCYTE (OHS): 20.9 % (ref 18–48)
RELATIVE MONOCYTE (OHS): 11.2 % (ref 4–15)
RELATIVE NEUTROPHIL (OHS): 65.6 % (ref 38–73)
SODIUM SERPL-SCNC: 130 MMOL/L (ref 136–145)
WBC # BLD AUTO: 10.18 K/UL (ref 3.9–12.7)

## 2025-06-24 PROCEDURE — 80053 COMPREHEN METABOLIC PANEL: CPT

## 2025-06-24 PROCEDURE — 83735 ASSAY OF MAGNESIUM: CPT

## 2025-06-24 PROCEDURE — 36415 COLL VENOUS BLD VENIPUNCTURE: CPT | Mod: PN

## 2025-06-24 PROCEDURE — 84100 ASSAY OF PHOSPHORUS: CPT

## 2025-06-24 PROCEDURE — 85025 COMPLETE CBC W/AUTO DIFF WBC: CPT

## 2025-06-25 ENCOUNTER — OFFICE VISIT (OUTPATIENT)
Dept: FAMILY MEDICINE | Facility: CLINIC | Age: 65
End: 2025-06-25
Payer: COMMERCIAL

## 2025-06-25 ENCOUNTER — TELEPHONE (OUTPATIENT)
Dept: GASTROENTEROLOGY | Facility: CLINIC | Age: 65
End: 2025-06-25
Payer: COMMERCIAL

## 2025-06-25 VITALS
WEIGHT: 198.19 LBS | HEART RATE: 68 BPM | OXYGEN SATURATION: 97 % | SYSTOLIC BLOOD PRESSURE: 136 MMHG | BODY MASS INDEX: 27.75 KG/M2 | DIASTOLIC BLOOD PRESSURE: 80 MMHG | HEIGHT: 71 IN

## 2025-06-25 DIAGNOSIS — Z98.61 CAD S/P PERCUTANEOUS CORONARY ANGIOPLASTY: Chronic | ICD-10-CM

## 2025-06-25 DIAGNOSIS — I25.10 CAD S/P PERCUTANEOUS CORONARY ANGIOPLASTY: Chronic | ICD-10-CM

## 2025-06-25 DIAGNOSIS — I10 ESSENTIAL HYPERTENSION: Chronic | ICD-10-CM

## 2025-06-25 DIAGNOSIS — E87.79 OTHER HYPERVOLEMIA: ICD-10-CM

## 2025-06-25 DIAGNOSIS — E87.1 HYPONATREMIA: ICD-10-CM

## 2025-06-25 DIAGNOSIS — Z09 HOSPITAL DISCHARGE FOLLOW-UP: Primary | ICD-10-CM

## 2025-06-25 DIAGNOSIS — I48.0 PAF (PAROXYSMAL ATRIAL FIBRILLATION): Chronic | ICD-10-CM

## 2025-06-25 PROBLEM — R79.89 ELEVATED BRAIN NATRIURETIC PEPTIDE (BNP) LEVEL: Status: RESOLVED | Noted: 2025-06-15 | Resolved: 2025-06-25

## 2025-06-25 PROBLEM — R79.89 ELEVATED TROPONIN: Status: RESOLVED | Noted: 2025-06-15 | Resolved: 2025-06-25

## 2025-06-25 PROBLEM — R74.01 TRANSAMINASEMIA: Status: RESOLVED | Noted: 2025-06-08 | Resolved: 2025-06-25

## 2025-06-25 PROBLEM — R60.0 LOWER EXTREMITY EDEMA: Status: RESOLVED | Noted: 2025-06-08 | Resolved: 2025-06-25

## 2025-06-25 PROBLEM — J90 PLEURAL EFFUSION ON LEFT: Status: RESOLVED | Noted: 2025-06-14 | Resolved: 2025-06-25

## 2025-06-25 PROBLEM — I21.4 NSTEMI (NON-ST ELEVATED MYOCARDIAL INFARCTION): Status: RESOLVED | Noted: 2025-06-16 | Resolved: 2025-06-25

## 2025-06-25 PROCEDURE — 99999 PR PBB SHADOW E&M-EST. PATIENT-LVL IV: CPT | Mod: PBBFAC,,, | Performed by: STUDENT IN AN ORGANIZED HEALTH CARE EDUCATION/TRAINING PROGRAM

## 2025-06-25 NOTE — TELEPHONE ENCOUNTER
Spoke to pt regarding cscope ordered by PCP. Pt stated he had recent stents placed. Advised pt we will have to wait at least 6 months - 1 yr. Advised pt he will need to have clearance from cardiologist before scheduling.   Pt verbalized understanding.

## 2025-06-25 NOTE — PROGRESS NOTES
"Plan:     Marshall was seen today for follow-up.    Diagnoses and all orders for this visit:    Hospital discharge follow-up    Hyponatremia  -     Comprehensive Metabolic Panel; Future  -     Magnesium; Future    CAD S/P percutaneous coronary angioplasty  Comments:  ASA 81 mg daily, Plavix 75 mg daily; start statin at upcoming appt assuming labs wnl.    Essential hypertension  Comments:  Toprol-XL 50 mg daily    Other hypervolemia  Comments:  Increase Lasix back to 40 mg daily + KCL 10 mEq with plan to re-evaluate at upcoming appt in 1 week.  Orders:  -     X-Ray Chest PA And Lateral; Future    PAF (paroxysmal atrial fibrillation)  Comments:  Amiodarone 400 mg BID, Eliquis 5 mg BID       Follow up in about 1 week (around 7/2/2025), or if symptoms worsen or fail to improve.    Raven Whitman MD  06/25/2025    Subjective:      Patient ID: Marshall Victor is a 64 y.o. male    Chief Complaint   Patient presents with    Follow-up     Hospital visit last Thursday afib     HPI  64 y.o. male with a PMHx as documented below presents to clinic today for the following:    Recent hospital admission for new-onset afib w/ RVR and NSTEMI. S/p LHC w/ PCI x2 on 6/16/25. Discharged from hospital w/ Lasix 40 mg daily + KCL 10 mEq daily 2/2 SOB 2/2 elevated LVEDP and pulm edema following repeat DCCV. Discharge plan also included "asa, plavix, and eliquis for 30 days, then stop the aspirin".    Today, pt reports persistent orthopnea and BLE pitting edema. He recently cut Lasix dose in half per recs upon hospital discharge.    Upcoming cardiology appt on 7/10/25.    Afib:   - Amiodarone 400 mg BID, Eliquis 5 mg BID  - S/p cardioversion x2 (6/10/25, 6/17/25)    CAD s/p PCI x2 (6/16/25):   - ASA 81 mg daily, Plavix 75 mg daily    HTN:   - Toprol-XL 50 mg daily    Review of Systems   Constitutional:  Positive for malaise/fatigue. Negative for chills and fever.   Respiratory:  Positive for cough. Negative for shortness of breath.  "   Cardiovascular:  Positive for orthopnea and leg swelling. Negative for chest pain.   Gastrointestinal:  Negative for abdominal pain, constipation, diarrhea, nausea and vomiting.   Genitourinary:  Negative for dysuria.     Current Outpatient Medications   Medication Instructions    amiodarone (PACERONE) 400 mg, Oral, 2 times daily    apixaban (ELIQUIS) 5 mg, Oral, 2 times daily    aspirin (ECOTRIN) 81 mg, Oral, Daily    azelastine (ASTELIN) 137 mcg, Nasal, Daily    clopidogreL (PLAVIX) 75 mg, Oral, Daily    furosemide (LASIX) 40 mg, Oral, Daily PRN    magnesium oxide (MAG-OX) 400 mg, Oral, Daily    metoprolol succinate (TOPROL-XL) 50 mg, Oral, Daily    nicotine (NICODERM CQ) 21 mg/24 hr 1 patch, Transdermal, Daily    omeprazole (PRILOSEC) 20 mg, Oral, Daily    potassium chloride (MICRO-K) 10 MEQ CpSR 10 mEq, Oral, Daily PRN      Past Medical History:   Diagnosis Date    Atrial fibrillation with RVR 06/08/2025    CAD S/P percutaneous coronary angioplasty 06/25/2025    Essential hypertension 08/09/2019    ALYSON (generalized anxiety disorder) 08/04/2020    History of smoking at least 1 pack per day for at least 30 years 12/18/2023    NSTEMI (non-ST elevated myocardial infarction) 06/16/2025    Other hyperlipidemia 08/09/2019    Squamous cell carcinoma, face 2011    s/p excision    White coat syndrome with diagnosis of hypertension 02/06/2018     Past Surgical History:   Procedure Laterality Date    dental implants  12/08/2023    INTRAVASCULAR ULTRASOUND, CORONARY  6/16/2025    Procedure: IVUS LCX;  Surgeon: Daryl Shepard MD;  Location: [facility] CATH;  Service: Cardiology;;    LEFT HEART CATHETERIZATION  6/16/2025    Procedure: Left heart cath Rm 3604;  Surgeon: Daryl Shepard MD;  [facility];  Service: Cardiology;;    LITHOTRIPSY, CORONARY TRANSLUMINAL, PERCUTANEOUS  6/16/2025    Procedure: PTCA LCX (Shockwave);  Surgeon: Daryl Shepard MD;  Location: [facility] CATH;  Service: Cardiology;;    PERCUTANEOUS  "CORONARY INTERVENTION, ARTERY  6/16/2025    Procedure: CARROLL LCX;  Surgeon: Daryl Shepard MD;  Location: [facility];  Service: Cardiology;;    TRANSESOPHAGEAL ECHOCARDIOGRAM WITH POSSIBLE CARDIOVERSION (ADA W/ POSS CARDIOVERSION) N/A 6/10/2025    Procedure: TRANSESOPHAGEAL ECHOCARDIOGRAM WITH POSSIBLE CARDIOVERSION (ADA W/ POSS CARDIOVERSION);  Surgeon: Ree Steven MD;  Location: [facility];  Service: Cardiology;  Laterality: N/A;    TRANSESOPHAGEAL ECHOCARDIOGRAM WITH POSSIBLE CARDIOVERSION (ADA W/ POSS CARDIOVERSION) N/A 6/17/2025    Procedure: TRANSESOPHAGEAL ECHOCARDIOGRAM WITH POSSIBLE CARDIOVERSION (ADA W/ POSS CARDIOVERSION);  Surgeon: Ree Steven MD;  Location: [facility];  Service: Cardiology;  Laterality: N/A;     Review of patient's allergies indicates:   Allergen Reactions    Penicillins      As a child broke out     Family History   Problem Relation Name Age of Onset    Arthritis Mother Brian     Bladder Cancer Father Father         passed away age 59    Prostate cancer Father Father     Lung cancer Maternal Grandfather Grandfather         passed away age 77; Hx of tobacco use     Social History[1]    Currently on File with [facility]:   Most Recent Immunizations   Administered Date(s) Administered    COVID-19 MRNA, LN-S PF (MODERNA HALF 0.25 ML DOSE) 07/13/2022    COVID-19 Vaccine 07/13/2022    COVID-19, MRNA, LN-S, PF (MODERNA FULL 0.5 ML DOSE) 02/19/2021    Influenza (FLUBLOK) - Quadrivalent - Recombinant - PF *Preferred* (egg allergy) 10/14/2022    Influenza - Quadrivalent - PF *Preferred* (6 months and older) 11/06/2021    Pneumococcal Polysaccharide - 23 Valent 01/02/2019    Tdap 12/08/2017    Zoster Recombinant 10/14/2022     Objective:      Vitals:    06/25/25 1250   BP: 136/80   Pulse: 68   SpO2: 97%   Weight: 89.9 kg (198 lb 3.1 oz)   Height: 5' 11" (1.803 m)     Body mass index is 27.64 kg/m².    Physical Exam  Vitals reviewed.   Constitutional:       General: He is not in acute " distress.  HENT:      Head: Normocephalic and atraumatic.   Cardiovascular:      Rate and Rhythm: Normal rate.   Pulmonary:      Effort: Pulmonary effort is normal. No respiratory distress.   Musculoskeletal:      Right lower leg: Edema (pitting, +1) present.      Left lower leg: Edema (pitting, +1) present.   Neurological:      General: No focal deficit present.      Mental Status: He is alert and oriented to person, place, and time. Mental status is at baseline.        Assessment:       1. Hospital discharge follow-up    2. Hyponatremia    3. CAD S/P percutaneous coronary angioplasty    4. Essential hypertension    5. Other hypervolemia    6. PAF (paroxysmal atrial fibrillation)        Raven Whitman MD  [facility]  Office: [phone]   Fax: [phone]  06/25/2025      Disclaimer: This note was partly generated using dictation software which may occasionally result in transcription errors.    Total time spent on this encounter includes face to face time and non-face to face time preparing to see the patient (eg, review of tests), obtaining and/or reviewing separately obtained history, documenting clinical information in the electronic or other health record, independently interpreting results, and communicating results to the patient/family/caregiver, or care coordinator.      Visit today included increased complexity associated with the care of the episodic problem (see above) addressed and managing the longitudinal care of the patient due to the serious and/or complex managed problem(s) (see above).          [1]   Social History  Tobacco Use    Smoking status: Some Days     Current packs/day: 1.00     Average packs/day: 1 pack/day for 30.0 years (30.0 ttl pk-yrs)     Types: Cigarettes     Passive exposure: Yes    Smokeless tobacco: Never    Tobacco comments:     down to 1 pack a week for cigarettes   Substance Use Topics    Alcohol use: Yes     Alcohol/week: 5.0 standard  drinks of alcohol     Types: 5 Cans of beer per week     Comment: [facility]    Drug use: No

## 2025-06-26 ENCOUNTER — HOSPITAL ENCOUNTER (OUTPATIENT)
Dept: RADIOLOGY | Facility: HOSPITAL | Age: 65
Discharge: HOME | End: 2025-06-26
Attending: STUDENT IN AN ORGANIZED HEALTH CARE EDUCATION/TRAINING PROGRAM
Payer: COMMERCIAL

## 2025-06-26 DIAGNOSIS — E87.79 OTHER HYPERVOLEMIA: ICD-10-CM

## 2025-06-26 PROCEDURE — 71046 X-RAY EXAM CHEST 2 VIEWS: CPT | Mod: 26,,, | Performed by: RADIOLOGY

## 2025-06-26 PROCEDURE — 71046 X-RAY EXAM CHEST 2 VIEWS: CPT | Mod: TC,PN

## 2025-06-27 ENCOUNTER — RESULTS FOLLOW-UP (OUTPATIENT)
Dept: FAMILY MEDICINE | Facility: CLINIC | Age: 65
End: 2025-06-27

## 2025-06-27 ENCOUNTER — NURSE TRIAGE (OUTPATIENT)
Dept: ADMINISTRATIVE | Facility: CLINIC | Age: 65
End: 2025-06-27
Payer: COMMERCIAL

## 2025-06-28 NOTE — TELEPHONE ENCOUNTER
Had been in the hospital for A fib and had a stent placed last week. States one of the medications is making him nauseated. The number one side effect for the amdiodarone is nausea. Wanting to stop taking the medication. Nauseated presently. On call provider Dr Covarrubias  contacted and MD states that he can stop the medication and to contact cardiology Monday morning when office opens. Pt advised and verbalized understanding.  Reason for Disposition   Taking prescription medication that could cause nausea (e.g., narcotics/opiates, antibiotics, OCPs, many others)    Additional Information   Negative: Shock suspected (e.g., cold/pale/clammy skin, too weak to stand, low BP, rapid pulse)   Negative: Sounds like a life-threatening emergency to the triager   Negative: Unable to walk, or can only walk with assistance (e.g., requires support)   Negative: Difficulty breathing   Negative: [1] Insulin-dependent diabetes (Type I) AND [2] glucose > 400 mg/dL (22 mmol/L)   Negative: [1] Drinking very little AND [2] dehydration suspected (e.g., no urine > 12 hours, very dry mouth, very lightheaded)   Negative: Patient sounds very sick or weak to the triager   Negative: Fever > 104 F (40 C)   Negative: [1] Fever > 101 F (38.3 C) AND [2] age > 60 years   Negative: [1] Fever > 100 F (37.8 C) AND [2] bedridden (e.g., CVA, chronic illness, recovering from surgery)   Negative: [1] Fever > 100 F (37.8 C) AND [2] diabetes mellitus or weak immune system (e.g., HIV positive, cancer chemo, splenectomy, organ transplant, chronic steroids)   Negative: Taking any of the following medications: digoxin (Lanoxin), lithium, theophylline, phenytoin (Dilantin)   Negative: Yellowish color of the skin or white of the eye (i.e., jaundice)   Negative: Fever present > 3 days (72 hours)    Protocols used: Nausea-A-AH

## 2025-06-30 ENCOUNTER — LAB VISIT (OUTPATIENT)
Dept: LAB | Facility: HOSPITAL | Age: 65
End: 2025-06-30
Payer: COMMERCIAL

## 2025-06-30 DIAGNOSIS — E87.1 HYPONATREMIA: ICD-10-CM

## 2025-06-30 LAB
ALBUMIN SERPL BCP-MCNC: 3.7 G/DL (ref 3.5–5.2)
ALP SERPL-CCNC: 87 UNIT/L (ref 40–150)
ALT SERPL W/O P-5'-P-CCNC: 27 UNIT/L (ref 10–44)
ANION GAP (OHS): 10 MMOL/L (ref 8–16)
AST SERPL-CCNC: 23 UNIT/L (ref 11–45)
BILIRUB SERPL-MCNC: 0.8 MG/DL (ref 0.1–1)
BUN SERPL-MCNC: 15 MG/DL (ref 8–23)
CALCIUM SERPL-MCNC: 8.8 MG/DL (ref 8.7–10.5)
CHLORIDE SERPL-SCNC: 90 MMOL/L (ref 95–110)
CO2 SERPL-SCNC: 26 MMOL/L (ref 23–29)
CREAT SERPL-MCNC: 1.1 MG/DL (ref 0.5–1.4)
GFR SERPLBLD CREATININE-BSD FMLA CKD-EPI: >60 ML/MIN/1.73/M2
GLUCOSE SERPL-MCNC: 99 MG/DL (ref 70–110)
MAGNESIUM SERPL-MCNC: 2.1 MG/DL (ref 1.6–2.6)
POTASSIUM SERPL-SCNC: 4.8 MMOL/L (ref 3.5–5.1)
PROT SERPL-MCNC: 6.4 GM/DL (ref 6–8.4)
SODIUM SERPL-SCNC: 126 MMOL/L (ref 136–145)

## 2025-06-30 PROCEDURE — 36415 COLL VENOUS BLD VENIPUNCTURE: CPT | Mod: PN

## 2025-06-30 PROCEDURE — 83735 ASSAY OF MAGNESIUM: CPT

## 2025-06-30 PROCEDURE — 80053 COMPREHEN METABOLIC PANEL: CPT

## 2025-07-02 ENCOUNTER — RESULTS FOLLOW-UP (OUTPATIENT)
Dept: FAMILY MEDICINE | Facility: CLINIC | Age: 65
End: 2025-07-02

## 2025-07-02 ENCOUNTER — HOSPITAL ENCOUNTER (OUTPATIENT)
Dept: RADIOLOGY | Facility: HOSPITAL | Age: 65
Discharge: HOME | End: 2025-07-02
Attending: STUDENT IN AN ORGANIZED HEALTH CARE EDUCATION/TRAINING PROGRAM
Payer: COMMERCIAL

## 2025-07-02 ENCOUNTER — OFFICE VISIT (OUTPATIENT)
Dept: FAMILY MEDICINE | Facility: CLINIC | Age: 65
End: 2025-07-02
Payer: COMMERCIAL

## 2025-07-02 VITALS
HEART RATE: 71 BPM | RESPIRATION RATE: 16 BRPM | DIASTOLIC BLOOD PRESSURE: 90 MMHG | SYSTOLIC BLOOD PRESSURE: 140 MMHG | HEIGHT: 71 IN | WEIGHT: 203.94 LBS | OXYGEN SATURATION: 100 % | BODY MASS INDEX: 28.55 KG/M2 | TEMPERATURE: 98 F

## 2025-07-02 DIAGNOSIS — G47.00 INSOMNIA, UNSPECIFIED TYPE: ICD-10-CM

## 2025-07-02 DIAGNOSIS — E87.1 HYPONATREMIA: ICD-10-CM

## 2025-07-02 DIAGNOSIS — E87.79 OTHER HYPERVOLEMIA: ICD-10-CM

## 2025-07-02 DIAGNOSIS — E87.1 HYPONATREMIA: Chronic | ICD-10-CM

## 2025-07-02 DIAGNOSIS — E87.79 OTHER HYPERVOLEMIA: Primary | ICD-10-CM

## 2025-07-02 DIAGNOSIS — Z79.899 LONG TERM CURRENT USE OF DIURETIC: ICD-10-CM

## 2025-07-02 PROCEDURE — 71046 X-RAY EXAM CHEST 2 VIEWS: CPT | Mod: 26,,, | Performed by: RADIOLOGY

## 2025-07-02 PROCEDURE — 99999 PR PBB SHADOW E&M-EST. PATIENT-LVL IV: CPT | Mod: PBBFAC,,, | Performed by: STUDENT IN AN ORGANIZED HEALTH CARE EDUCATION/TRAINING PROGRAM

## 2025-07-02 PROCEDURE — 71046 X-RAY EXAM CHEST 2 VIEWS: CPT | Mod: TC,PN

## 2025-07-02 RX ORDER — BUSPIRONE HYDROCHLORIDE 10 MG/1
10 TABLET ORAL NIGHTLY
Qty: 30 TABLET | Refills: 11 | Status: SHIPPED | OUTPATIENT
Start: 2025-07-02 | End: 2026-07-02

## 2025-07-02 RX ORDER — POTASSIUM CHLORIDE 750 MG/1
10 CAPSULE, EXTENDED RELEASE ORAL 2 TIMES DAILY
Qty: 180 CAPSULE | Refills: 3 | Status: SHIPPED | OUTPATIENT
Start: 2025-07-02 | End: 2026-06-27

## 2025-07-02 RX ORDER — FUROSEMIDE 40 MG/1
40 TABLET ORAL 2 TIMES DAILY
Qty: 60 TABLET | Refills: 11 | Status: SHIPPED | OUTPATIENT
Start: 2025-07-02 | End: 2026-07-02

## 2025-07-02 RX ORDER — AMIODARONE HYDROCHLORIDE 400 MG/1
400 TABLET ORAL
COMMUNITY

## 2025-07-02 RX ORDER — LANOLIN ALCOHOL/MO/W.PET/CERES
400 CREAM (GRAM) TOPICAL DAILY
Qty: 90 TABLET | Refills: 3 | Status: SHIPPED | OUTPATIENT
Start: 2025-07-02 | End: 2026-06-27

## 2025-07-02 NOTE — PROGRESS NOTES
"Plan:      Marshall was seen today for follow-up.    Diagnoses and all orders for this visit:    Other hypervolemia  -     X-Ray Chest PA And Lateral; Future  -     furosemide (LASIX) 40 MG tablet; Take 1 tablet (40 mg total) by mouth 2 (two) times daily.  -     potassium chloride (MICRO-K) 10 MEQ CpSR; Take 1 capsule (10 mEq total) by mouth 2 (two) times daily.  -     Comprehensive Metabolic Panel; Future    Hyponatremia  -     furosemide (LASIX) 40 MG tablet; Take 1 tablet (40 mg total) by mouth 2 (two) times daily.  -     potassium chloride (MICRO-K) 10 MEQ CpSR; Take 1 capsule (10 mEq total) by mouth 2 (two) times daily.  -     Comprehensive Metabolic Panel; Future    Insomnia, unspecified type  -     busPIRone (BUSPAR) 10 MG tablet; Take 1 tablet (10 mg total) by mouth every evening.    Long term current use of diuretic  -     furosemide (LASIX) 40 MG tablet; Take 1 tablet (40 mg total) by mouth 2 (two) times daily.  -     potassium chloride (MICRO-K) 10 MEQ CpSR; Take 1 capsule (10 mEq total) by mouth 2 (two) times daily.  -     Comprehensive Metabolic Panel; Future  -     Magnesium; Future  -     magnesium oxide (MAG-OX) 400 mg (241.3 mg magnesium) tablet; Take 1 tablet (400 mg total) by mouth once daily.      Follow up in about 1 week (around 7/9/2025), or if symptoms worsen or fail to improve.    Raven Whitman MD  07/02/2025    Subjective:      Patient ID: Marshall Victor is a 64 y.o. male    Chief Complaint   Patient presents with    Follow-up     HPI  64 y.o. male with a PMHx as documented below presents to clinic today for the following:    Recent hospital admission for new-onset afib w/ RVR and NSTEMI. S/p LHC w/ PCI x2 on 6/16/25. Discharged from hospital w/ Lasix 40 mg daily + KCL 10 mEq daily 2/2 SOB 2/2 elevated LVEDP and pulm edema following repeat DCCV. Discharge plan also included "asa, plavix, and eliquis for 30 days, then stop the aspirin".     At last clinic appt, pt reported persistent " orthopnea and BLE pitting edema. He had recently cut Lasix dose in half per recs upon hospital discharge.     Upcoming cardiology appt on 7/10/25.    CXR 6/25/25 w/ trace bilateral pleural effusions. Lasix increased to 40 mg BID with improvement in BLE swelling and breathing. Pt reports running out of Lasix yesterday and therefore only taking one dose. Repeat labs on 6/30/25 showed drop in Na 126 from Na 130 (6/24/25).     Amio decreased from 400 mg BID to 200 mg BID due to side effect of nausea/vomiting.     Afib:   - Amiodarone 200 mg BID, Eliquis 5 mg BID  - S/p cardioversion x2 (6/10/25, 6/17/25)     CAD s/p PCI x2 (6/16/25):   - ASA 81 mg daily, Plavix 75 mg daily     HTN:   - Toprol-XL 50 mg daily    ROS  Constitutional:  Negative for chills and fever.   Respiratory:  Negative for shortness of breath.    Cardiovascular:  Negative for chest pain.   Gastrointestinal:  Negative for abdominal pain, constipation, diarrhea, nausea and vomiting.     Current Outpatient Medications   Medication Instructions    amiodarone (PACERONE) 400 mg    apixaban (ELIQUIS) 5 mg, Oral, 2 times daily    aspirin (ECOTRIN) 81 mg, Oral, Daily    azelastine (ASTELIN) 137 mcg, Nasal, Daily    busPIRone (BUSPAR) 10 mg, Oral, Nightly    clopidogreL (PLAVIX) 75 mg, Oral, Daily    furosemide (LASIX) 40 mg, Oral, 2 times daily    magnesium oxide (MAG-OX) 400 mg, Oral, Daily    metoprolol succinate (TOPROL-XL) 50 mg, Oral, Daily    omeprazole (PRILOSEC) 20 mg, Oral, Daily    potassium chloride (MICRO-K) 10 MEQ CpSR 10 mEq, Oral, 2 times daily      Past Medical History:   Diagnosis Date    Atrial fibrillation with RVR 06/08/2025    CAD S/P percutaneous coronary angioplasty 06/25/2025    Essential hypertension 08/09/2019    ALYSON (generalized anxiety disorder) 08/04/2020    History of smoking at least 1 pack per day for at least 30 years 12/18/2023    NSTEMI (non-ST elevated myocardial infarction) 06/16/2025    Other hyperlipidemia 08/09/2019     "Squamous cell carcinoma, face 2011    s/p excision    White coat syndrome with diagnosis of hypertension 02/06/2018      Objective:      Vitals:    07/02/25 0933   BP: (!) 140/90   Pulse: 71   Resp: 16   Temp: 97.6 °F (36.4 °C)   TempSrc: Oral   SpO2: 100%   Weight: 92.5 kg (203 lb 14.8 oz)   Height: 5' 11" (1.803 m)     Body mass index is 28.44 kg/m².    Physical Exam  Cardiovascular:      Rate and Rhythm: Normal rate.   Pulmonary:      Effort: Pulmonary effort is normal. No respiratory distress.      Breath sounds: No stridor. No wheezing, rhonchi or rales.      Comments: Cough  Musculoskeletal:      Comments: Trace BLE pitting edema to mid-calf.        Assessment:       1. Other hypervolemia    2. Hyponatremia    3. Insomnia, unspecified type    4. Long term current use of diuretic        Raven Whitman MD  [facility]  Office: [phone]   Fax: [phone]  07/02/2025      Disclaimer: This note was partly generated using dictation software which may occasionally result in transcription errors.    Total time spent on this encounter includes face to face time and non-face to face time preparing to see the patient (eg, review of tests), obtaining and/or reviewing separately obtained history, documenting clinical information in the electronic or other health record, independently interpreting results, and communicating results to the patient/family/caregiver, or care coordinator.      Visit today included increased complexity associated with the care of the episodic problem (see above) addressed and managing the longitudinal care of the patient due to the serious and/or complex managed problem(s) (see above).   "

## 2025-07-06 ENCOUNTER — E-CONSULT (OUTPATIENT)
Dept: NEPHROLOGY | Facility: HOSPITAL | Age: 65
End: 2025-07-06
Payer: COMMERCIAL

## 2025-07-06 DIAGNOSIS — E87.1 HYPONATREMIA: Primary | ICD-10-CM

## 2025-07-06 PROCEDURE — 99451 NTRPROF PH1/NTRNET/EHR 5/>: CPT | Mod: ,,, | Performed by: INTERNAL MEDICINE

## 2025-07-06 NOTE — CONSULTS
[facility] NEPHROLOGY  Response for E-Consult     Patient Name: Marshall Victor  MRN: [id number]  Primary Care Provider: Raven Whitman MD   Requesting Provider: Raven Whitman MD  Consults    After evaluation of your eConsult clinical questions, I believe the patient should be scheduled for an office visit in our specialty due to chronic hyponatremia.    Total time of Consultation: 5 minute    *This eConsult is based on the clinical data available to me and is furnished without benefit of a physician examination.  The eConsult will need to be interpreted in light of any clinical issues of changes in patient status not available to me at the time rime of filing this eConsults.  Significant changes in patient condition of level of acuity should result in a referral for in person consultation and reevaluation.  Please alert me if you have any furth questions.     Thank you for this eConsult referral.     Con Nunez MD  [facility] NEPHROLOGY

## 2025-07-07 ENCOUNTER — LAB VISIT (OUTPATIENT)
Dept: LAB | Facility: HOSPITAL | Age: 65
End: 2025-07-07
Attending: STUDENT IN AN ORGANIZED HEALTH CARE EDUCATION/TRAINING PROGRAM
Payer: COMMERCIAL

## 2025-07-07 ENCOUNTER — PATIENT MESSAGE (OUTPATIENT)
Dept: FAMILY MEDICINE | Facility: CLINIC | Age: 65
End: 2025-07-07
Payer: COMMERCIAL

## 2025-07-07 DIAGNOSIS — E87.79 OTHER HYPERVOLEMIA: ICD-10-CM

## 2025-07-07 DIAGNOSIS — E87.1 HYPONATREMIA: ICD-10-CM

## 2025-07-07 LAB
ALBUMIN SERPL BCP-MCNC: 3.8 G/DL (ref 3.5–5.2)
ALP SERPL-CCNC: 97 UNIT/L (ref 40–150)
ALT SERPL W/O P-5'-P-CCNC: 30 UNIT/L (ref 10–44)
ANION GAP (OHS): 11 MMOL/L (ref 8–16)
AST SERPL-CCNC: 27 UNIT/L (ref 11–45)
BILIRUB SERPL-MCNC: 1.1 MG/DL (ref 0.1–1)
BUN SERPL-MCNC: 13 MG/DL (ref 8–23)
CALCIUM SERPL-MCNC: 8.9 MG/DL (ref 8.7–10.5)
CHLORIDE SERPL-SCNC: 96 MMOL/L (ref 95–110)
CO2 SERPL-SCNC: 26 MMOL/L (ref 23–29)
CREAT SERPL-MCNC: 1.1 MG/DL (ref 0.5–1.4)
GFR SERPLBLD CREATININE-BSD FMLA CKD-EPI: >60 ML/MIN/1.73/M2
GLUCOSE SERPL-MCNC: 93 MG/DL (ref 70–110)
OSMOLALITY SERPL: 275 MOSM/KG (ref 280–300)
POTASSIUM SERPL-SCNC: 4.3 MMOL/L (ref 3.5–5.1)
PROT SERPL-MCNC: 6.7 GM/DL (ref 6–8.4)
SODIUM SERPL-SCNC: 133 MMOL/L (ref 136–145)

## 2025-07-07 PROCEDURE — 83930 ASSAY OF BLOOD OSMOLALITY: CPT

## 2025-07-07 PROCEDURE — 36415 COLL VENOUS BLD VENIPUNCTURE: CPT | Mod: PN

## 2025-07-07 PROCEDURE — 80053 COMPREHEN METABOLIC PANEL: CPT

## 2025-07-16 ENCOUNTER — OFFICE VISIT (OUTPATIENT)
Dept: FAMILY MEDICINE | Facility: CLINIC | Age: 65
End: 2025-07-16
Payer: COMMERCIAL

## 2025-07-16 VITALS
HEART RATE: 70 BPM | WEIGHT: 189.81 LBS | SYSTOLIC BLOOD PRESSURE: 124 MMHG | DIASTOLIC BLOOD PRESSURE: 68 MMHG | HEIGHT: 71 IN | BODY MASS INDEX: 26.57 KG/M2 | OXYGEN SATURATION: 98 %

## 2025-07-16 DIAGNOSIS — J30.9 ALLERGIC RHINITIS, UNSPECIFIED SEASONALITY, UNSPECIFIED TRIGGER: ICD-10-CM

## 2025-07-16 DIAGNOSIS — Z98.61 CAD S/P PERCUTANEOUS CORONARY ANGIOPLASTY: Primary | Chronic | ICD-10-CM

## 2025-07-16 DIAGNOSIS — I25.10 CAD S/P PERCUTANEOUS CORONARY ANGIOPLASTY: Primary | Chronic | ICD-10-CM

## 2025-07-16 PROCEDURE — 99999 PR PBB SHADOW E&M-EST. PATIENT-LVL IV: CPT | Mod: PBBFAC,,, | Performed by: STUDENT IN AN ORGANIZED HEALTH CARE EDUCATION/TRAINING PROGRAM

## 2025-07-16 RX ORDER — FLUTICASONE PROPIONATE 50 MCG
1 SPRAY, SUSPENSION (ML) NASAL DAILY
Qty: 16 G | Refills: 2 | Status: SHIPPED | OUTPATIENT
Start: 2025-07-16 | End: 2026-07-11

## 2025-07-16 NOTE — PROGRESS NOTES
"Plan:      Marshall was seen today for follow-up.    Diagnoses and all orders for this visit:    CAD S/P percutaneous coronary angioplasty  -     apixaban (ELIQUIS) 5 mg Tab; Take 1 tablet (5 mg total) by mouth 2 (two) times daily.    Allergic rhinitis, unspecified seasonality, unspecified trigger  -     fluticasone propionate (FLONASE) 50 mcg/actuation nasal spray; 1 spray (50 mcg total) by Each Nostril route once daily.    Follow-up loop recorded placement.     Follow up if symptoms worsen or fail to improve.    Raven Whitman MD  07/16/2025    Subjective:      Patient ID: Marshall Victor is a 64 y.o. male    Chief Complaint   Patient presents with    Follow-up     HPI  64 y.o. male with a PMHx as documented below presents to clinic today for the following:    Recent hospital admission for new-onset afib w/ RVR and NSTEMI. S/p LHC w/ PCI x2 on 6/16/25. Discharged from hospital w/ Lasix 40 mg daily + KCL 10 mEq daily 2/2 SOB 2/2 elevated LVEDP and pulm edema following repeat DCCV. Discharge plan also included "asa, plavix, and eliquis for 30 days, then stop the aspirin".    Sodium trended down over first 1-2 weeks after hospital discharge - back to acceptable range after temporarily increasing dose of Lasix.    Amio decreased from 400 mg BID to 200 mg BID due to side effect of nausea/vomiting.    Upcoming loop recorder insertion procedure on 7/22/25 w/ Dr. Steven w/ Cards.     Afib:   - Amiodarone 200 mg BID, Eliquis 5 mg BID  - S/p cardioversion x2 (6/10/25, 6/17/25)     CAD s/p PCI x2 (6/16/25):   - ASA 81 mg daily, Plavix 75 mg daily     HTN:   - Toprol-XL 50 mg daily    ROS  Constitutional:  Negative for chills and fever.   Respiratory:  Negative for shortness of breath.    Cardiovascular:  Negative for chest pain.   Gastrointestinal:  Negative for abdominal pain, constipation, diarrhea, nausea and vomiting.     Current Outpatient Medications   Medication Instructions    amiodarone (PACERONE) 200 mg, Oral, " "Daily    apixaban (ELIQUIS) 5 mg, Oral, 2 times daily    aspirin (ECOTRIN) 81 mg, Oral, Daily    azelastine (ASTELIN) 137 mcg, Nasal, Daily    busPIRone (BUSPAR) 10 mg, Oral, Nightly    clopidogreL (PLAVIX) 75 mg, Oral, Daily    fluticasone propionate (FLONASE) 50 mcg, Each Nostril, Daily    furosemide (LASIX) 40 mg, Oral, Daily    magnesium oxide (MAG-OX) 400 mg, Oral, Daily    metoprolol succinate (TOPROL-XL) 50 mg, Oral, Daily    omeprazole (PRILOSEC) 20 mg, Oral, Daily    potassium chloride (MICRO-K) 10 MEQ CpSR 10 mEq, Oral, 2 times daily    pravastatin (PRAVACHOL) 10 mg, Oral, Daily      Past Medical History:   Diagnosis Date    Atrial fibrillation with RVR 06/08/2025    CAD S/P percutaneous coronary angioplasty 06/25/2025    Essential hypertension 08/09/2019    ALYSON (generalized anxiety disorder) 08/04/2020    History of smoking at least 1 pack per day for at least 30 years 12/18/2023    NSTEMI (non-ST elevated myocardial infarction) 06/16/2025    Other hyperlipidemia 08/09/2019    Squamous cell carcinoma, face 2011    s/p excision    White coat syndrome with diagnosis of hypertension 02/06/2018      Objective:      Vitals:    07/16/25 1044   BP: 124/68   BP Location: Left arm   Patient Position: Sitting   Pulse: 70   SpO2: 98%   Weight: 86.1 kg (189 lb 13.1 oz)   Height: 5' 11" (1.803 m)     Body mass index is 26.47 kg/m².    Physical Exam   Constitutional:       General: No acute distress.  HENT:      Head: Normocephalic and atraumatic.   Pulmonary:      Effort: Pulmonary effort is normal. No respiratory distress.   Neurological:      General: No focal deficit present.      Mental Status: Alert and oriented to person, place, and time. Mental status is at baseline.    Assessment:       1. CAD S/P percutaneous coronary angioplasty    2. Allergic rhinitis, unspecified seasonality, unspecified trigger        Raven Whitman MD  [facility]  Office: [phone]   Fax: [phone]  07/16/2025      Disclaimer: This note was partly generated using dictation software which may occasionally result in transcription errors.    Total time spent on this encounter includes face to face time and non-face to face time preparing to see the patient (eg, review of tests), obtaining and/or reviewing separately obtained history, documenting clinical information in the electronic or other health record, independently interpreting results, and communicating results to the patient/family/caregiver, or care coordinator.      Visit today included increased complexity associated with the care of the episodic problem (see above) addressed and managing the longitudinal care of the patient due to the serious and/or complex managed problem(s) (see above).

## 2025-07-23 ENCOUNTER — E-CONSULT (OUTPATIENT)
Dept: CARDIOLOGY | Facility: CLINIC | Age: 65
End: 2025-07-23
Payer: COMMERCIAL

## 2025-07-23 DIAGNOSIS — E87.70 HYPERVOLEMIA, UNSPECIFIED HYPERVOLEMIA TYPE: Primary | ICD-10-CM

## 2025-07-23 NOTE — CONSULTS
[facility] - Cardiology  Response for E-Consult     Patient Name: Marshall Victor  MRN: [id number]  Primary Care Provider: Raven Whitman MD   Requesting Provider: Raven Whitman MD  E-Consult to General Cardiology  Consult performed by: Nic Bustamante MD  Consult ordered by: Raven Whitman MD          Chart reviewed personally.      If there is concern for volume overload, would double-dose Lasix for 3-5 days, then go back to regular dose and reassess.    Also could consider CardioMEMS in the future, would recommend discussing with primary cardiologist.    Additional future steps to consider: NA    Total time of Consultation: 5 minute    I did not speak to the requesting provider verbally about this.     *This eConsult is based on the clinical data available to me and is furnished without benefit of a physical examination. The eConsult will need to be interpreted in light of any clinical issues or changes in patient status not available to me at the time of filing this eConsults. Significant changes in patient condition or level of acuity should result in immediate formal consultation and reevaluation. Please alert me if you have further questions.    Thank you for this eConsult referral.     Nic Bustamante MD  [facility] - [facility]iology

## 2025-07-25 ENCOUNTER — LAB VISIT (OUTPATIENT)
Dept: LAB | Facility: HOSPITAL | Age: 65
End: 2025-07-25
Attending: STUDENT IN AN ORGANIZED HEALTH CARE EDUCATION/TRAINING PROGRAM
Payer: COMMERCIAL

## 2025-07-25 ENCOUNTER — PATIENT MESSAGE (OUTPATIENT)
Dept: FAMILY MEDICINE | Facility: CLINIC | Age: 65
End: 2025-07-25
Payer: COMMERCIAL

## 2025-07-25 DIAGNOSIS — E87.79 OTHER HYPERVOLEMIA: ICD-10-CM

## 2025-07-25 DIAGNOSIS — E87.1 HYPONATREMIA: ICD-10-CM

## 2025-07-25 LAB
ALBUMIN SERPL BCP-MCNC: 3.9 G/DL (ref 3.5–5.2)
ALP SERPL-CCNC: 85 UNIT/L (ref 40–150)
ALT SERPL W/O P-5'-P-CCNC: 41 UNIT/L (ref 0–55)
ANION GAP (OHS): 10 MMOL/L (ref 8–16)
AST SERPL-CCNC: 27 UNIT/L (ref 0–50)
BILIRUB SERPL-MCNC: 0.7 MG/DL (ref 0.1–1)
BUN SERPL-MCNC: 22 MG/DL (ref 8–23)
CALCIUM SERPL-MCNC: 9.6 MG/DL (ref 8.7–10.5)
CHLORIDE SERPL-SCNC: 97 MMOL/L (ref 95–110)
CO2 SERPL-SCNC: 27 MMOL/L (ref 23–29)
CREAT SERPL-MCNC: 1.1 MG/DL (ref 0.5–1.4)
GFR SERPLBLD CREATININE-BSD FMLA CKD-EPI: >60 ML/MIN/1.73/M2
GLUCOSE SERPL-MCNC: 95 MG/DL (ref 70–110)
POTASSIUM SERPL-SCNC: 5.1 MMOL/L (ref 3.5–5.1)
PROT SERPL-MCNC: 6.9 GM/DL (ref 6–8.4)
SODIUM SERPL-SCNC: 134 MMOL/L (ref 136–145)

## 2025-07-25 PROCEDURE — 80053 COMPREHEN METABOLIC PANEL: CPT

## 2025-07-25 PROCEDURE — 36415 COLL VENOUS BLD VENIPUNCTURE: CPT | Mod: PN

## 2025-07-28 ENCOUNTER — RESULTS FOLLOW-UP (OUTPATIENT)
Dept: FAMILY MEDICINE | Facility: CLINIC | Age: 65
End: 2025-07-28
Payer: COMMERCIAL

## 2025-07-30 DIAGNOSIS — L29.9 ITCHING: Primary | ICD-10-CM

## 2025-07-30 RX ORDER — HYDROXYZINE HYDROCHLORIDE 10 MG/1
10 TABLET, FILM COATED ORAL 3 TIMES DAILY PRN
Qty: 30 TABLET | Refills: 0 | Status: SHIPPED | OUTPATIENT
Start: 2025-07-30 | End: 2025-08-29